# Patient Record
Sex: MALE | Race: WHITE | Employment: OTHER | URBAN - METROPOLITAN AREA
[De-identification: names, ages, dates, MRNs, and addresses within clinical notes are randomized per-mention and may not be internally consistent; named-entity substitution may affect disease eponyms.]

---

## 2017-12-28 PROBLEM — C80.1 CANCER (HCC): Status: ACTIVE | Noted: 2017-12-28

## 2018-01-23 PROBLEM — R50.9 FEVER: Status: ACTIVE | Noted: 2018-01-23

## 2018-01-24 PROBLEM — C80.1 CANCER (HCC): Status: RESOLVED | Noted: 2017-12-28 | Resolved: 2018-01-24

## 2018-01-24 PROBLEM — E86.0 DEHYDRATION: Status: ACTIVE | Noted: 2018-01-24

## 2018-01-24 PROBLEM — D64.9 ANEMIA: Status: ACTIVE | Noted: 2018-01-24

## 2018-01-24 PROBLEM — R41.0 CONFUSION: Status: ACTIVE | Noted: 2018-01-24

## 2018-01-24 PROBLEM — E11.9 TYPE 2 DIABETES MELLITUS (HCC): Chronic | Status: ACTIVE | Noted: 2018-01-24

## 2018-01-24 PROBLEM — C15.9 ESOPHAGEAL CANCER (HCC): Status: ACTIVE | Noted: 2018-01-24

## 2018-01-25 PROBLEM — R55 SYNCOPE: Status: ACTIVE | Noted: 2018-01-25

## 2018-04-11 PROBLEM — E86.0 DEHYDRATION: Status: RESOLVED | Noted: 2018-01-24 | Resolved: 2018-04-11

## 2018-04-13 ENCOUNTER — HOSPITAL ENCOUNTER (OUTPATIENT)
Dept: RADIATION ONCOLOGY | Age: 68
Discharge: HOME | End: 2018-04-13
Payer: MEDICARE

## 2018-04-13 VITALS
SYSTOLIC BLOOD PRESSURE: 136 MMHG | WEIGHT: 180 LBS | DIASTOLIC BLOOD PRESSURE: 82 MMHG | HEART RATE: 58 BPM | BODY MASS INDEX: 25.83 KG/M2

## 2018-04-13 DIAGNOSIS — C80.1 CANCER (HCC): Primary | ICD-10-CM

## 2018-04-13 DIAGNOSIS — Z92.3 S/P RADIATION THERAPY > 12 WKS AGO: ICD-10-CM

## 2018-04-13 PROCEDURE — 99213 OFFICE O/P EST LOW 20 MIN: CPT | Performed by: NURSE PRACTITIONER

## 2018-04-13 PROCEDURE — 99212 OFFICE O/P EST SF 10 MIN: CPT

## 2018-04-13 RX ORDER — AMIODARONE HYDROCHLORIDE 400 MG/1
400 TABLET ORAL DAILY
COMMUNITY
Start: 2018-02-20 | End: 2018-04-13 | Stop reason: ALTCHOICE

## 2018-04-13 RX ORDER — GLIPIZIDE 5 MG/1
2.5 TABLET ORAL DAILY
Refills: 2 | COMMUNITY
Start: 2018-04-04

## 2018-08-14 ENCOUNTER — HOSPITAL ENCOUNTER (OUTPATIENT)
Dept: RADIATION ONCOLOGY | Age: 68
Discharge: HOME | End: 2018-08-14
Payer: MEDICARE

## 2018-08-14 VITALS
DIASTOLIC BLOOD PRESSURE: 72 MMHG | SYSTOLIC BLOOD PRESSURE: 132 MMHG | HEART RATE: 59 BPM | WEIGHT: 174 LBS | TEMPERATURE: 98.1 F | BODY MASS INDEX: 24.97 KG/M2

## 2018-08-14 DIAGNOSIS — C80.1 CANCER (HCC): Primary | ICD-10-CM

## 2018-08-14 PROCEDURE — 99213 OFFICE O/P EST LOW 20 MIN: CPT | Performed by: NURSE PRACTITIONER

## 2018-08-14 PROCEDURE — 99212 OFFICE O/P EST SF 10 MIN: CPT

## 2018-08-14 RX ORDER — CAPECITABINE 500 MG/1
TABLET, FILM COATED ORAL
COMMUNITY
Start: 2018-08-09 | End: 2021-10-07 | Stop reason: CLARIF

## 2018-08-14 NOTE — PROGRESS NOTES
 Cerebral artery occlusion with cerebral infarction ([facility] [address]     Diabetes mellitus ([facility] [address]     GERD (gastroesophageal reflux disease)     Hyperlipidemia     Hypertension     TIA (transient ischemic attack)     Vocal cord paralysis, unilateral complete 02/16/2018       Past Surgical History:   Procedure Laterality Date    DIAGNOSTIC CARDIAC CATH LAB PROCEDURE      ESOPHAGECTOMY N/A 02/12/2018    [facility], Dr. Gil Stacy       Social History     Social History    Marital status:      Spouse name: N/A    Number of children: N/A    Years of education: N/A     Occupational History          Social History Main Topics    Smoking status: Never Smoker    Smokeless tobacco: Never Used    Alcohol use No    Drug use: No    Sexual activity: Not on file     Other Topics Concern    Not on file     Social History Narrative    No narrative on file     History reviewed. No pertinent family history. Allergies:   Lipitor [atorvastatin]      Current Outpatient Prescriptions   Medication Sig Dispense Refill    capecitabine (XELODA) 500 MG chemo tablet       glipiZIDE (GLUCOTROL) 5 MG tablet TAKE 1/2 TABLET BY MOUTH EVERY DAY IN THE MORNING  2    Lansoprazole (PREVACID PO) Take 30 mg by mouth daily OTC      aspirin EC 81 MG EC tablet Take 81 mg by mouth daily      metoprolol (TOPROL-XL) 50 MG XL tablet Take 50 mg by mouth daily       rosuvastatin (CRESTOR) 10 MG tablet Take 40 mg by mouth daily. No current facility-administered medications for this encounter. B-12 injection every month (receives on chemotherapy days). REVIEW OF SYSTEMS:    Constitutional:  No fever, chills or rigors. Eyes: No changes in vision. No eye discharge or pain  ENT: + Xerostomia. No headaches, hearing loss or vertigo. No mouth sores or sore throat. No change in taste or smell.    Cardiovascular: No chest discomfort, dyspnea on exertion, palpitations or loss of consciousness. Respiratory: No cough, no sputum production, wheezing and no pleuritic pain. Gastrointestinal: No abdominal pain, nausea, vomiting, diarrhea or constipation. No blood in stools or  hematemesis   Genitourinary: No dysuria, trouble voiding or hematuria. Musculoskeletal: No gait disturbance, weakness or joint complaints. Integumentary: No rash or pruritis. Neurological: + numbness/ tingling both hands. No headache, diplopia, change in muscle strength. No change in gait, balance, coordination, mood, affect, memory, mentation, behavior. Psychiatric: No anxiety or depression. Recent grief (loss of family dog). Endocrine: No temperature intolerance. No excessive thirst, fluid intake, or urination. No tremor. Hematologic/Lymphatic: No abnormal bruising or bleeding, blood clots or swollen lymph nodes. Allergic/Immunologic: No nasal congestion or hives. PHYSICAL EXAMINATION:   Vitals:    08/14/18 0906   BP: 132/72   Pulse: 59   Temp: 98.1 °F (36.7 °C)   TempSrc: Oral   Weight: 174 lb (78.9 kg)     Body mass index is 24.97 kg/m². Constitutional: A well developed, well nourished 70 y.o. male who is alert, oriented, cooperative and in no apparent distress. Head: Normocephalic and atraumatic. EENT: EOMI REJI. MMM. No icterus. External canals are patent and no discharge was appreciated. Septum was midline, mucosa was without erythema, exudates or cobblestoning. No thrush was noted. Neck: Supple without thyromegaly. Trachea was midline. No carotid bruits. No stridor or subglottic wheeze. Respiratory: Chest expansion was symmetrical. Breath sounds bilaterally were clear to auscultation. No wheezes, rhonchi or rales. No intercostal retraction or use of accessory muscles. Cardiovascular: Regular rate and rhythm. No murmur or rubs. Abdomen: Soft, rounded and without organomegaly. No rebound guarding. Lymphatic: No lymphadenopathy.   Musculoskeletal:

## 2018-08-14 NOTE — PROGRESS NOTES
Yany Apodaca  8/14/2018  9:19 AM      Vitals:    08/14/18 0906   BP: 132/72   Pulse: 59   Temp: 98.1 °F (36.7 °C)    : Wt Readings from Last 1 Encounters:   08/14/18 174 lb (78.9 kg)                Current Outpatient Prescriptions:     capecitabine (XELODA) 500 MG chemo tablet, , Disp: , Rfl:     glipiZIDE (GLUCOTROL) 5 MG tablet, TAKE 1/2 TABLET BY MOUTH EVERY DAY IN THE MORNING, Disp: , Rfl: 2    Lansoprazole (PREVACID PO), Take 30 mg by mouth daily OTC, Disp: , Rfl:     aspirin EC 81 MG EC tablet, Take 81 mg by mouth daily, Disp: , Rfl:     metoprolol (TOPROL-XL) 50 MG XL tablet, Take 50 mg by mouth daily , Disp: , Rfl:     rosuvastatin (CRESTOR) 10 MG tablet, Take 40 mg by mouth daily. , Disp: , Rfl:       Patient is seen today in follow up- patient presents as a 4 month follow up post XRT to the esophagus. The patient followed with Geovany Mason at [facility] and had a surgical resection to the tumor post concurrent chemo/RT. The patient follows with Glenn Kay for med onc management and he is still currently getting chemotherapy. The patient stated his last treatment with chemo would be in September. The patient no longer has a PEG tube and he is drinking and eating with no current issues. The patients liquids no longer have to be thickened. The patient declined wanting a falls prevention safety sheet, the patient stated he already has a falls safety sheet at home. FALLS RISK SCREEN  Instructions:  Assess the patient and enter the appropriate indicators that are present for fall risk identification. Total the numbers entered and assign a fall risk score from Table 2.  Reassess patient at a minimum every 12 weeks or with status change. Assessment   Date  8/14/2018   1. Mental Ability: confusion/cognitively impaired 0 (3)   2. Elimination Issues: incontinence, frequency 0 (3)   3.   Ambulatory: use of assistive devices (walker, cane, off-loading devices),        attached to equipment (IV pole, (Yellow sticker Level III) placed on patient chart       NUTRITION RISK SCREEN    8/14/2018   Patient:  Anat Webster  Sex:  male    NUTRITION RISK SCREEN  Instructions:  Assess the patient and enter the appropriate indicators that are present for nutrition risk identification. Total the numbers entered and assign a risk score. Follow the appropriate action for total score listed below. Assessment   Date  8/14/2018     1. Poor appetite?--a. One week or greater (1)                       b. One month or greater (2) 0        2. Unplanned wt loss?--a. Greater than 5# in 1 week(1)                                  b. Greater than 10# in 1 month (2)                                  c. Greater than 20# in 6 mos (1) 1        3. Diagnosis of Head or Neck Cancer? (2)   0    4. If yes, difficulty swallowing? (1) 0        5. Receiving nutrition via feeding tube or parenteral nutrition? (1) 0        6. If yes, report of problems with feeding tube? (1) 0      TOTAL 1         Score of 0-1: No action  Score 2 or greater:  1. For Non-Diabetic Patient: Recommend adding Ensure Complete 2xdaily and provide              patient with Ensure wellness bag with coupons; For Diabetic Patient, Recommend adding Glucerna Shake 2xdaily and provide patient with Glucerna Wellness bag with coupons  2.  Route to the dietitian via [employer]

## 2018-11-06 ENCOUNTER — OFFICE VISIT (OUTPATIENT)
Dept: CARDIOLOGY CLINIC | Age: 68
End: 2018-11-06
Payer: MEDICARE

## 2018-11-06 VITALS
HEIGHT: 71 IN | BODY MASS INDEX: 25.06 KG/M2 | RESPIRATION RATE: 16 BRPM | HEART RATE: 57 BPM | WEIGHT: 179 LBS | SYSTOLIC BLOOD PRESSURE: 146 MMHG | DIASTOLIC BLOOD PRESSURE: 88 MMHG

## 2018-11-06 DIAGNOSIS — I25.10 ATHEROSCLEROSIS OF NATIVE CORONARY ARTERY OF NATIVE HEART WITHOUT ANGINA PECTORIS: Primary | Chronic | ICD-10-CM

## 2018-11-06 PROCEDURE — 4040F PNEUMOC VAC/ADMIN/RCVD: CPT | Performed by: INTERNAL MEDICINE

## 2018-11-06 PROCEDURE — G8420 CALC BMI NORM PARAMETERS: HCPCS | Performed by: INTERNAL MEDICINE

## 2018-11-06 PROCEDURE — 93000 ELECTROCARDIOGRAM COMPLETE: CPT | Performed by: INTERNAL MEDICINE

## 2018-11-06 PROCEDURE — 3017F COLORECTAL CA SCREEN DOC REV: CPT | Performed by: INTERNAL MEDICINE

## 2018-11-06 PROCEDURE — 99213 OFFICE O/P EST LOW 20 MIN: CPT | Performed by: INTERNAL MEDICINE

## 2018-11-06 PROCEDURE — 1101F PT FALLS ASSESS-DOCD LE1/YR: CPT | Performed by: INTERNAL MEDICINE

## 2018-11-06 PROCEDURE — 1123F ACP DISCUSS/DSCN MKR DOCD: CPT | Performed by: INTERNAL MEDICINE

## 2018-11-06 PROCEDURE — 1036F TOBACCO NON-USER: CPT | Performed by: INTERNAL MEDICINE

## 2018-11-06 PROCEDURE — G8598 ASA/ANTIPLAT THER USED: HCPCS | Performed by: INTERNAL MEDICINE

## 2018-11-06 PROCEDURE — G8427 DOCREV CUR MEDS BY ELIG CLIN: HCPCS | Performed by: INTERNAL MEDICINE

## 2018-11-06 PROCEDURE — G8484 FLU IMMUNIZE NO ADMIN: HCPCS | Performed by: INTERNAL MEDICINE

## 2018-11-06 NOTE — PROGRESS NOTES
History reviewed. No pertinent family history. SUBJECTIVE: Candice Sanford presents to the office today for follow up of chronic cardiac diagnoses . He complains of no cardiac symptoms and denies chest pain, dyspnea, fatigue, irregular heart beat, near-syncope, orthopnea, palpitations, paroxysmal nocturnal dyspnea and syncope. He is back to usual routine of walking and doing chores. Extensive treatment of local oncology and [address] [place] [address] surgeon for esophageal CA with surgery, XRT and chemo. Also recent BCDs no progression         Review of Systems:  Negative 10 system review other than stated above. Objective:   Physical Exam   Constitutional: He is oriented to person, place, and time. He appears well-developed. He is cooperative. See vitals above. HENT:   Head: Normocephalic and atraumatic. Normal lips, teeth, gums, oral mucosa wet. Eyes: Conjunctivae are normal. Pupils are equal, round  Neck: No JVD present. Cardiovascular: Regular rhythm, S1 normal, S2 normal, intact distal pulses and normal pulses. PMI is not displaced. Exam reveals no gallop. No murmur heard. Carotid bruit is not present   Pulmonary/Chest: Effort normal and breath sounds normal. No respiratory distress. Abdominal: Soft. Normal appearance and bowel sounds are normal. He exhibits no abdominal bruit and no pulsatile midline mass. There is no hepatosplenomegaly. There is no tenderness. Musculoskeletal: Normal range of motion. He exhibits no edema. Gait normal   Neurological: He is alert and oriented to person, place, and time. Gait normal.   Skin: Skin is warm and dry. No bruising, no ecchymosis and no rash noted. Rash is not nodular. He is not diaphoretic. No cyanosis. Psychiatric: He has a normal mood and affect. His behavior is normal. Thought content normal.     EKG: nonspecific ST and T waves changes, sinus bradycardia  , unchanged from previous tracings.      ASSESSMENT & PLAN:    Patient Active Problem List

## 2018-11-07 ENCOUNTER — PRE-PROCEDURE TELEPHONE (OUTPATIENT)
Dept: RADIATION ONCOLOGY | Age: 68
End: 2018-11-07

## 2019-11-19 ENCOUNTER — OFFICE VISIT (OUTPATIENT)
Dept: CARDIOLOGY CLINIC | Age: 69
End: 2019-11-19
Payer: MEDICARE

## 2019-11-19 VITALS
WEIGHT: 192 LBS | BODY MASS INDEX: 26.88 KG/M2 | HEART RATE: 43 BPM | DIASTOLIC BLOOD PRESSURE: 82 MMHG | SYSTOLIC BLOOD PRESSURE: 142 MMHG | HEIGHT: 71 IN | RESPIRATION RATE: 16 BRPM

## 2019-11-19 DIAGNOSIS — I25.10 ATHEROSCLEROSIS OF NATIVE CORONARY ARTERY OF NATIVE HEART WITHOUT ANGINA PECTORIS: Primary | ICD-10-CM

## 2019-11-19 DIAGNOSIS — E78.5 HYPERLIPIDEMIA, UNSPECIFIED HYPERLIPIDEMIA TYPE: ICD-10-CM

## 2019-11-19 PROCEDURE — 3017F COLORECTAL CA SCREEN DOC REV: CPT | Performed by: INTERNAL MEDICINE

## 2019-11-19 PROCEDURE — 93000 ELECTROCARDIOGRAM COMPLETE: CPT | Performed by: INTERNAL MEDICINE

## 2019-11-19 PROCEDURE — G8484 FLU IMMUNIZE NO ADMIN: HCPCS | Performed by: INTERNAL MEDICINE

## 2019-11-19 PROCEDURE — G8598 ASA/ANTIPLAT THER USED: HCPCS | Performed by: INTERNAL MEDICINE

## 2019-11-19 PROCEDURE — G8427 DOCREV CUR MEDS BY ELIG CLIN: HCPCS | Performed by: INTERNAL MEDICINE

## 2019-11-19 PROCEDURE — 4040F PNEUMOC VAC/ADMIN/RCVD: CPT | Performed by: INTERNAL MEDICINE

## 2019-11-19 PROCEDURE — G8417 CALC BMI ABV UP PARAM F/U: HCPCS | Performed by: INTERNAL MEDICINE

## 2019-11-19 PROCEDURE — 1123F ACP DISCUSS/DSCN MKR DOCD: CPT | Performed by: INTERNAL MEDICINE

## 2019-11-19 PROCEDURE — 99214 OFFICE O/P EST MOD 30 MIN: CPT | Performed by: INTERNAL MEDICINE

## 2019-11-19 PROCEDURE — 1036F TOBACCO NON-USER: CPT | Performed by: INTERNAL MEDICINE

## 2019-11-19 RX ORDER — LISINOPRIL 2.5 MG/1
TABLET ORAL
Refills: 5 | Status: ON HOLD | COMMUNITY
Start: 2019-10-22 | End: 2020-09-07 | Stop reason: HOSPADM

## 2020-09-04 ENCOUNTER — HOSPITAL ENCOUNTER (INPATIENT)
Age: 70
LOS: 3 days | Discharge: HOME | DRG: 684 | End: 2020-09-07
Attending: EMERGENCY MEDICINE | Admitting: FAMILY MEDICINE
Payer: MEDICARE

## 2020-09-04 ENCOUNTER — APPOINTMENT (OUTPATIENT)
Dept: ULTRASOUND IMAGING | Age: 70
DRG: 684 | End: 2020-09-04
Payer: MEDICARE

## 2020-09-04 PROBLEM — N18.9 ACUTE KIDNEY INJURY SUPERIMPOSED ON CHRONIC KIDNEY DISEASE (HCC): Status: ACTIVE | Noted: 2020-09-04

## 2020-09-04 PROBLEM — I16.0 HYPERTENSIVE URGENCY: Status: ACTIVE | Noted: 2020-09-04

## 2020-09-04 PROBLEM — N17.9 ACUTE KIDNEY INJURY SUPERIMPOSED ON CHRONIC KIDNEY DISEASE (HCC): Status: ACTIVE | Noted: 2020-09-04

## 2020-09-04 PROBLEM — N18.9 CHRONIC RENAL INSUFFICIENCY: Chronic | Status: ACTIVE | Noted: 2020-09-04

## 2020-09-04 LAB
ALBUMIN SERPL-MCNC: 4.3 G/DL (ref 3.5–5.2)
ALP BLD-CCNC: 127 U/L (ref 40–129)
ALT SERPL-CCNC: 31 U/L (ref 0–40)
AMORPHOUS: ABNORMAL
ANION GAP SERPL CALCULATED.3IONS-SCNC: 10 MMOL/L (ref 7–16)
AST SERPL-CCNC: 31 U/L (ref 0–39)
BACTERIA: ABNORMAL /HPF
BASOPHILS ABSOLUTE: 0.05 E9/L (ref 0–0.2)
BASOPHILS RELATIVE PERCENT: 0.7 % (ref 0–2)
BILIRUB SERPL-MCNC: 0.9 MG/DL (ref 0–1.2)
BILIRUBIN URINE: NEGATIVE
BLOOD, URINE: ABNORMAL
BUN BLDV-MCNC: 39 MG/DL (ref 8–23)
C3 COMPLEMENT: 111 MG/DL (ref 90–180)
C4 COMPLEMENT: 30 MG/DL (ref 10–40)
CALCIUM SERPL-MCNC: 9.2 MG/DL (ref 8.6–10.2)
CHLORIDE BLD-SCNC: 108 MMOL/L (ref 98–107)
CHLORIDE URINE RANDOM: 45 MMOL/L
CLARITY: CLEAR
CO2: 24 MMOL/L (ref 22–29)
COARSE CASTS, UA: ABNORMAL /LPF (ref 0–2)
COLOR: YELLOW
CREAT SERPL-MCNC: 3 MG/DL (ref 0.7–1.2)
CREATININE URINE: 105 MG/DL (ref 40–278)
EOSINOPHILS ABSOLUTE: 0.31 E9/L (ref 0.05–0.5)
EOSINOPHILS RELATIVE PERCENT: 4.3 % (ref 0–6)
GFR AFRICAN AMERICAN: 25
GFR NON-AFRICAN AMERICAN: 21 ML/MIN/1.73
GLUCOSE BLD-MCNC: 104 MG/DL (ref 74–99)
GLUCOSE URINE: NEGATIVE MG/DL
HAPTOGLOBIN: 122 MG/DL (ref 30–200)
HCT VFR BLD CALC: 40.1 % (ref 37–54)
HEMOGLOBIN: 14 G/DL (ref 12.5–16.5)
IMMATURE GRANULOCYTES #: 0.02 E9/L
IMMATURE GRANULOCYTES %: 0.3 % (ref 0–5)
KETONES, URINE: NEGATIVE MG/DL
LACTATE DEHYDROGENASE: 283 U/L (ref 135–225)
LEUKOCYTE ESTERASE, URINE: NEGATIVE
LYMPHOCYTES ABSOLUTE: 2.06 E9/L (ref 1.5–4)
LYMPHOCYTES RELATIVE PERCENT: 28.7 % (ref 20–42)
MCH RBC QN AUTO: 33.9 PG (ref 26–35)
MCHC RBC AUTO-ENTMCNC: 34.9 % (ref 32–34.5)
MCV RBC AUTO: 97.1 FL (ref 80–99.9)
METER GLUCOSE: 146 MG/DL (ref 74–99)
MONOCYTES ABSOLUTE: 0.61 E9/L (ref 0.1–0.95)
MONOCYTES RELATIVE PERCENT: 8.5 % (ref 2–12)
NEUTROPHILS ABSOLUTE: 4.14 E9/L (ref 1.8–7.3)
NEUTROPHILS RELATIVE PERCENT: 57.5 % (ref 43–80)
NITRITE, URINE: NEGATIVE
PDW BLD-RTO: 13.2 FL (ref 11.5–15)
PH UA: 5.5 (ref 5–9)
PLATELET # BLD: 129 E9/L (ref 130–450)
PMV BLD AUTO: 9.5 FL (ref 7–12)
POTASSIUM REFLEX MAGNESIUM: 3.9 MMOL/L (ref 3.5–5)
POTASSIUM, UR: 24.5 MMOL/L
PROTEIN PROTEIN: 45 MG/DL (ref 0–12)
PROTEIN UA: ABNORMAL MG/DL
RBC # BLD: 4.13 E12/L (ref 3.8–5.8)
RBC UA: ABNORMAL /HPF (ref 0–2)
REASON FOR REJECTION: NORMAL
REJECTED TEST: NORMAL
SODIUM BLD-SCNC: 142 MMOL/L (ref 132–146)
SODIUM URINE: 54 MMOL/L
SPECIFIC GRAVITY UA: 1.02 (ref 1–1.03)
TOTAL PROTEIN: 7.2 G/DL (ref 6.4–8.3)
UROBILINOGEN, URINE: 0.2 E.U./DL
WBC # BLD: 7.2 E9/L (ref 4.5–11.5)
WBC UA: ABNORMAL /HPF (ref 0–5)

## 2020-09-04 PROCEDURE — 2060000000 HC ICU INTERMEDIATE R&B

## 2020-09-04 PROCEDURE — 82962 GLUCOSE BLOOD TEST: CPT

## 2020-09-04 PROCEDURE — 86803 HEPATITIS C AB TEST: CPT

## 2020-09-04 PROCEDURE — 83010 ASSAY OF HAPTOGLOBIN QUANT: CPT

## 2020-09-04 PROCEDURE — 2580000003 HC RX 258: Performed by: STUDENT IN AN ORGANIZED HEALTH CARE EDUCATION/TRAINING PROGRAM

## 2020-09-04 PROCEDURE — 96374 THER/PROPH/DIAG INJ IV PUSH: CPT

## 2020-09-04 PROCEDURE — 80053 COMPREHEN METABOLIC PANEL: CPT

## 2020-09-04 PROCEDURE — 99284 EMERGENCY DEPT VISIT MOD MDM: CPT

## 2020-09-04 PROCEDURE — 85025 COMPLETE CBC W/AUTO DIFF WBC: CPT

## 2020-09-04 PROCEDURE — 86160 COMPLEMENT ANTIGEN: CPT

## 2020-09-04 PROCEDURE — 6370000000 HC RX 637 (ALT 250 FOR IP): Performed by: INTERNAL MEDICINE

## 2020-09-04 PROCEDURE — 82436 ASSAY OF URINE CHLORIDE: CPT

## 2020-09-04 PROCEDURE — 87340 HEPATITIS B SURFACE AG IA: CPT

## 2020-09-04 PROCEDURE — 81001 URINALYSIS AUTO W/SCOPE: CPT

## 2020-09-04 PROCEDURE — 86706 HEP B SURFACE ANTIBODY: CPT

## 2020-09-04 PROCEDURE — 86038 ANTINUCLEAR ANTIBODIES: CPT

## 2020-09-04 PROCEDURE — 6360000002 HC RX W HCPCS: Performed by: INTERNAL MEDICINE

## 2020-09-04 PROCEDURE — 84156 ASSAY OF PROTEIN URINE: CPT

## 2020-09-04 PROCEDURE — 86255 FLUORESCENT ANTIBODY SCREEN: CPT

## 2020-09-04 PROCEDURE — 6370000000 HC RX 637 (ALT 250 FOR IP): Performed by: FAMILY MEDICINE

## 2020-09-04 PROCEDURE — 84300 ASSAY OF URINE SODIUM: CPT

## 2020-09-04 PROCEDURE — 36415 COLL VENOUS BLD VENIPUNCTURE: CPT

## 2020-09-04 PROCEDURE — 83615 LACTATE (LD) (LDH) ENZYME: CPT

## 2020-09-04 PROCEDURE — 82570 ASSAY OF URINE CREATININE: CPT

## 2020-09-04 PROCEDURE — 76770 US EXAM ABDO BACK WALL COMP: CPT

## 2020-09-04 PROCEDURE — 83516 IMMUNOASSAY NONANTIBODY: CPT

## 2020-09-04 PROCEDURE — 2500000003 HC RX 250 WO HCPCS: Performed by: INTERNAL MEDICINE

## 2020-09-04 PROCEDURE — 6360000002 HC RX W HCPCS: Performed by: STUDENT IN AN ORGANIZED HEALTH CARE EDUCATION/TRAINING PROGRAM

## 2020-09-04 PROCEDURE — 84133 ASSAY OF URINE POTASSIUM: CPT

## 2020-09-04 RX ORDER — SODIUM CHLORIDE 0.9 % (FLUSH) 0.9 %
10 SYRINGE (ML) INJECTION EVERY 12 HOURS SCHEDULED
Status: DISCONTINUED | OUTPATIENT
Start: 2020-09-04 | End: 2020-09-07 | Stop reason: HOSPADM

## 2020-09-04 RX ORDER — M-VIT,TX,IRON,MINS/CALC/FOLIC 27MG-0.4MG
1 TABLET ORAL DAILY
Status: DISCONTINUED | OUTPATIENT
Start: 2020-09-04 | End: 2020-09-07 | Stop reason: HOSPADM

## 2020-09-04 RX ORDER — ACETAMINOPHEN 325 MG/1
650 TABLET ORAL EVERY 4 HOURS PRN
Status: DISCONTINUED | OUTPATIENT
Start: 2020-09-04 | End: 2020-09-07 | Stop reason: HOSPADM

## 2020-09-04 RX ORDER — DEXTROSE MONOHYDRATE 50 MG/ML
100 INJECTION, SOLUTION INTRAVENOUS PRN
Status: DISCONTINUED | OUTPATIENT
Start: 2020-09-04 | End: 2020-09-07 | Stop reason: HOSPADM

## 2020-09-04 RX ORDER — METOPROLOL SUCCINATE 25 MG/1
25 TABLET, EXTENDED RELEASE ORAL NIGHTLY
Status: DISCONTINUED | OUTPATIENT
Start: 2020-09-04 | End: 2020-09-07 | Stop reason: HOSPADM

## 2020-09-04 RX ORDER — LABETALOL HYDROCHLORIDE 5 MG/ML
10 INJECTION, SOLUTION INTRAVENOUS EVERY 4 HOURS PRN
Status: DISCONTINUED | OUTPATIENT
Start: 2020-09-04 | End: 2020-09-07 | Stop reason: HOSPADM

## 2020-09-04 RX ORDER — VITAMIN B COMPLEX
1000 TABLET ORAL DAILY
Status: DISCONTINUED | OUTPATIENT
Start: 2020-09-04 | End: 2020-09-07 | Stop reason: HOSPADM

## 2020-09-04 RX ORDER — ROSUVASTATIN CALCIUM 20 MG/1
40 TABLET, COATED ORAL DAILY
Status: DISCONTINUED | OUTPATIENT
Start: 2020-09-04 | End: 2020-09-07 | Stop reason: HOSPADM

## 2020-09-04 RX ORDER — LANOLIN ALCOHOL/MO/W.PET/CERES
1000 CREAM (GRAM) TOPICAL DAILY
Status: DISCONTINUED | OUTPATIENT
Start: 2020-09-04 | End: 2020-09-07 | Stop reason: HOSPADM

## 2020-09-04 RX ORDER — METOPROLOL SUCCINATE 25 MG/1
25 TABLET, EXTENDED RELEASE ORAL DAILY
Status: DISCONTINUED | OUTPATIENT
Start: 2020-09-04 | End: 2020-09-04 | Stop reason: SDUPTHER

## 2020-09-04 RX ORDER — CLONIDINE HYDROCHLORIDE 0.1 MG/1
0.1 TABLET ORAL ONCE
Status: COMPLETED | OUTPATIENT
Start: 2020-09-04 | End: 2020-09-04

## 2020-09-04 RX ORDER — SODIUM CHLORIDE 9 MG/ML
INJECTION, SOLUTION INTRAVENOUS CONTINUOUS
Status: DISCONTINUED | OUTPATIENT
Start: 2020-09-04 | End: 2020-09-06

## 2020-09-04 RX ORDER — 0.9 % SODIUM CHLORIDE 0.9 %
1000 INTRAVENOUS SOLUTION INTRAVENOUS ONCE
Status: COMPLETED | OUTPATIENT
Start: 2020-09-04 | End: 2020-09-04

## 2020-09-04 RX ORDER — CLONIDINE HYDROCHLORIDE 0.1 MG/1
0.1 TABLET ORAL 2 TIMES DAILY
Status: DISCONTINUED | OUTPATIENT
Start: 2020-09-04 | End: 2020-09-04

## 2020-09-04 RX ORDER — NICOTINE POLACRILEX 4 MG
15 LOZENGE BUCCAL PRN
Status: DISCONTINUED | OUTPATIENT
Start: 2020-09-04 | End: 2020-09-07 | Stop reason: HOSPADM

## 2020-09-04 RX ORDER — LANSOPRAZOLE 30 MG/1
30 CAPSULE, DELAYED RELEASE ORAL DAILY
Status: DISCONTINUED | OUTPATIENT
Start: 2020-09-04 | End: 2020-09-04 | Stop reason: ALTCHOICE

## 2020-09-04 RX ORDER — M-VIT,TX,IRON,MINS/CALC/FOLIC 27MG-0.4MG
1 TABLET ORAL DAILY
COMMUNITY

## 2020-09-04 RX ORDER — HYDRALAZINE HYDROCHLORIDE 20 MG/ML
5 INJECTION INTRAMUSCULAR; INTRAVENOUS EVERY 4 HOURS PRN
Status: DISCONTINUED | OUTPATIENT
Start: 2020-09-04 | End: 2020-09-07 | Stop reason: HOSPADM

## 2020-09-04 RX ORDER — HEPARIN SODIUM 10000 [USP'U]/ML
5000 INJECTION, SOLUTION INTRAVENOUS; SUBCUTANEOUS EVERY 8 HOURS SCHEDULED
Status: DISCONTINUED | OUTPATIENT
Start: 2020-09-04 | End: 2020-09-07 | Stop reason: HOSPADM

## 2020-09-04 RX ORDER — POLYETHYLENE GLYCOL 3350 17 G/17G
17 POWDER, FOR SOLUTION ORAL DAILY PRN
Status: DISCONTINUED | OUTPATIENT
Start: 2020-09-04 | End: 2020-09-07 | Stop reason: HOSPADM

## 2020-09-04 RX ORDER — ASPIRIN 81 MG/1
81 TABLET ORAL DAILY
Status: DISCONTINUED | OUTPATIENT
Start: 2020-09-04 | End: 2020-09-07 | Stop reason: HOSPADM

## 2020-09-04 RX ORDER — PANTOPRAZOLE SODIUM 40 MG/1
40 TABLET, DELAYED RELEASE ORAL
Status: DISCONTINUED | OUTPATIENT
Start: 2020-09-05 | End: 2020-09-07 | Stop reason: HOSPADM

## 2020-09-04 RX ORDER — DEXTROSE MONOHYDRATE 25 G/50ML
12.5 INJECTION, SOLUTION INTRAVENOUS PRN
Status: DISCONTINUED | OUTPATIENT
Start: 2020-09-04 | End: 2020-09-07 | Stop reason: HOSPADM

## 2020-09-04 RX ORDER — GLIPIZIDE 5 MG/1
2.5 TABLET ORAL
Status: DISCONTINUED | OUTPATIENT
Start: 2020-09-05 | End: 2020-09-07 | Stop reason: HOSPADM

## 2020-09-04 RX ORDER — HYDRALAZINE HYDROCHLORIDE 25 MG/1
25 TABLET, FILM COATED ORAL EVERY 8 HOURS SCHEDULED
Status: DISCONTINUED | OUTPATIENT
Start: 2020-09-04 | End: 2020-09-05

## 2020-09-04 RX ORDER — LANOLIN ALCOHOL/MO/W.PET/CERES
1000 CREAM (GRAM) TOPICAL DAILY
COMMUNITY

## 2020-09-04 RX ORDER — SODIUM CHLORIDE 0.9 % (FLUSH) 0.9 %
10 SYRINGE (ML) INJECTION PRN
Status: DISCONTINUED | OUTPATIENT
Start: 2020-09-04 | End: 2020-09-07 | Stop reason: HOSPADM

## 2020-09-04 RX ORDER — HYDRALAZINE HYDROCHLORIDE 20 MG/ML
10 INJECTION INTRAMUSCULAR; INTRAVENOUS ONCE
Status: COMPLETED | OUTPATIENT
Start: 2020-09-04 | End: 2020-09-04

## 2020-09-04 RX ORDER — SODIUM BICARBONATE 650 MG/1
650 TABLET ORAL 2 TIMES DAILY
COMMUNITY

## 2020-09-04 RX ORDER — SODIUM BICARBONATE 650 MG/1
650 TABLET ORAL 2 TIMES DAILY
Status: DISCONTINUED | OUTPATIENT
Start: 2020-09-04 | End: 2020-09-07 | Stop reason: HOSPADM

## 2020-09-04 RX ADMIN — CYANOCOBALAMIN TAB 1000 MCG 1000 MCG: 1000 TAB at 17:40

## 2020-09-04 RX ADMIN — HYDRALAZINE HYDROCHLORIDE 10 MG: 20 INJECTION, SOLUTION INTRAMUSCULAR; INTRAVENOUS at 13:49

## 2020-09-04 RX ADMIN — HEPARIN SODIUM 5000 UNITS: 10000 INJECTION INTRAVENOUS; SUBCUTANEOUS at 17:33

## 2020-09-04 RX ADMIN — MULTIPLE VITAMINS W/ MINERALS TAB 1 TABLET: TAB at 17:33

## 2020-09-04 RX ADMIN — HEPARIN SODIUM 5000 UNITS: 10000 INJECTION INTRAVENOUS; SUBCUTANEOUS at 21:31

## 2020-09-04 RX ADMIN — SODIUM CHLORIDE: 9 INJECTION, SOLUTION INTRAVENOUS at 14:56

## 2020-09-04 RX ADMIN — SODIUM BICARBONATE 650 MG: 650 TABLET ORAL at 20:24

## 2020-09-04 RX ADMIN — SODIUM CHLORIDE 1000 ML: 9 INJECTION, SOLUTION INTRAVENOUS at 12:31

## 2020-09-04 RX ADMIN — HYDRALAZINE HYDROCHLORIDE 5 MG: 20 INJECTION INTRAMUSCULAR; INTRAVENOUS at 21:35

## 2020-09-04 RX ADMIN — CLONIDINE HYDROCHLORIDE 0.1 MG: 0.1 TABLET ORAL at 16:02

## 2020-09-04 RX ADMIN — ROSUVASTATIN CALCIUM 40 MG: 20 TABLET, FILM COATED ORAL at 17:33

## 2020-09-04 RX ADMIN — LABETALOL HYDROCHLORIDE 10 MG: 5 INJECTION INTRAVENOUS at 15:33

## 2020-09-04 RX ADMIN — METOPROLOL SUCCINATE 25 MG: 25 TABLET, EXTENDED RELEASE ORAL at 20:24

## 2020-09-04 RX ADMIN — VITAMIN D 1000 UNITS: 25 TAB ORAL at 17:33

## 2020-09-04 RX ADMIN — HYDRALAZINE HYDROCHLORIDE 25 MG: 25 TABLET, FILM COATED ORAL at 17:38

## 2020-09-04 RX ADMIN — ASPIRIN 81 MG: 81 TABLET, COATED ORAL at 17:33

## 2020-09-04 ASSESSMENT — ENCOUNTER SYMPTOMS
WHEEZING: 0
DIARRHEA: 0
ABDOMINAL PAIN: 0
EYE DISCHARGE: 0
SORE THROAT: 0
NAUSEA: 0
VOMITING: 0
SINUS PRESSURE: 0
BACK PAIN: 0
SHORTNESS OF BREATH: 0
COUGH: 0
EYE REDNESS: 0
EYE PAIN: 0

## 2020-09-04 ASSESSMENT — PAIN SCALES - GENERAL: PAINLEVEL_OUTOF10: 0

## 2020-09-04 NOTE — CONSULTS
Nephrology Consult Note  Patient's Name: Jason Camacho  2:43 PM  9/4/2020    Nephrologist: Ania Butler    Reason for Consult:  THEODORA on CKD G3  Requesting Physician:  Danny Jackson MD    Chief Complaint:  Elevated cr    History Obtained From:  patient and past medical records    History of Present Ilness:    Jason Camacho is a 69 y.o. male with prior history CKD G3 with a  cr from 1.2-->1.4mg/dl. Pt seen by me in 2012 at which time he had a serum cr 1.5mg/dl. 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 Since ending the Chemo Rx, only issue infrequent kidney stone with out obstruction. Hx HTN for 10yrs with Hx CVA, No CAD/MI, no CHF. He has an 8 yr Hx DM2 no retinopathy but Neuropathy. His most recent creatinine in April 2020 2.0mg/dl. Dr. Travis Gallardo called 8/31/20 with the cr up to 2.5mg/dl and the lisinopril was stopped. A repeat cr 2 days ago up to 2.9mg/dl and in the ED today the cr up to 3.0mg/dl. Pt states over the recent past few days including today he has passed numerous kidney stones. No associated pain fever or chills. //94 since admission. He denies blurry vision, double vision, flushing, CP or SOB      Past Medical History:   Diagnosis Date    Cancer ([address] 2017    esophageal    Cerebral artery occlusion with cerebral infarction ([address]     Diabetes mellitus ([address]     GERD (gastroesophageal reflux disease)     Hyperlipidemia     Hypertension     TIA (transient ischemic attack)     Vocal cord paralysis, unilateral complete 02/16/2018       Past Surgical History:   Procedure Laterality Date    DIAGNOSTIC CARDIAC CATH LAB PROCEDURE      ESOPHAGECTOMY N/A 02/12/2018    [facility], Dr. Cisneros Deep         History reviewed.  No BASOPCT 0.7 09/04/2020    MONOSABS 0.61 09/04/2020    LYMPHSABS 2.06 09/04/2020    EOSABS 0.31 09/04/2020    BASOSABS 0.05 09/04/2020     Hemoglobin/Hematocrit:    Lab Results   Component Value Date    HGB 14.0 09/04/2020    HCT 40.1 09/04/2020     CMP:    Lab Results   Component Value Date     09/04/2020    K 3.9 09/04/2020     09/04/2020    CO2 24 09/04/2020    BUN 39 09/04/2020    CREATININE 3.0 09/04/2020    GFRAA 25 09/04/2020    LABGLOM 21 09/04/2020    GLUCOSE 104 09/04/2020    GLUCOSE 136 06/17/2011    PROT 7.2 09/04/2020    LABALBU 4.3 09/04/2020    CALCIUM 9.2 09/04/2020    BILITOT 0.9 09/04/2020    ALKPHOS 127 09/04/2020    AST 31 09/04/2020    ALT 31 09/04/2020     BMP:    Lab Results   Component Value Date     09/04/2020    K 3.9 09/04/2020     09/04/2020    CO2 24 09/04/2020    BUN 39 09/04/2020    LABALBU 4.3 09/04/2020    CREATININE 3.0 09/04/2020    CALCIUM 9.2 09/04/2020    GFRAA 25 09/04/2020    LABGLOM 21 09/04/2020    GLUCOSE 104 09/04/2020    GLUCOSE 136 06/17/2011     BUN/Creatinine:    Lab Results   Component Value Date    BUN 39 09/04/2020    CREATININE 3.0 09/04/2020     Hepatic Function Panel:    Lab Results   Component Value Date    ALKPHOS 127 09/04/2020    ALT 31 09/04/2020    AST 31 09/04/2020    PROT 7.2 09/04/2020    BILITOT 0.9 09/04/2020    BILIDIR 0.3 01/23/2018    IBILI 1.0 01/23/2018    LABALBU 4.3 09/04/2020     Albumin:    Lab Results   Component Value Date    LABALBU 4.3 09/04/2020     Calcium:    Lab Results   Component Value Date    CALCIUM 9.2 09/04/2020     Ionized Calcium:  No results found for: IONCA  Magnesium:  No results found for: MG  Phosphorus:  No results found for: PHOS  LDH:  No results found for: LDH  Uric Acid:  No results found for: LABURIC, URICACID  PT/INR:    Lab Results   Component Value Date    PROTIME 15.4 01/23/2018    PROTIME 12.1 06/17/2011    INR 1.4 01/23/2018     PTT:    Lab Results   Component Value Date    APTT 27.2 Bilateral nonobstructive renal calculi are seen. 10 mm calculus is    seen within an inferior pole calyx of the right kidney. An 18 mm    calculus is also seen within an inferior pole calyx of the left    kidney. No hydronephrosis is seen. Small cysts are seen along the    upper and lower poles of the left kidney.         The urinary bladder is decompressed  but otherwise grossly    unremarkable.              Impression          Bilateral nephrolithiasis. No hydronephrosis. Assessment  1-Stage II THEODORA with a FeNa 1.09% leading away from decreased effective renal perfusion as the etiology-acute issue may reflect the uncontrolled HTN  Renal US no hydro  UA small blood, protein trace, (+) casts, WBC 5-10 rare angie, LE (-), Ni (-)  Urine protein:Cr 0.43 which does not suggest Nephrotic Syndrome  PLAN:1. Start IVF   2-Check basic serologies  3. Continue off ACEI    2- HTN with CKD I-IV with HTN Urgency  BP above goal <130/80  PLAN:1. Resume metoprolol   2. Use IV hydralazine and alternating IV labetalol prn once has a functioning IV access  3. Dose presently with clonidine 0.1mg po as IV site at the time of my visit not working  4. Start oral hydralazine    3. CKD G3 with a  cr from 1.2-->1.4mg/dl presumed sec to microvascular disease and a Hx of Nephrolithiasis  PLAN:1. Strain urine for stone to send for sample    4. Sec HPTH of Renal Origin  Ca++ WNL  PLAN:1. Check PO4, PTH, Vit D    5. Thrombocytopenia  With the Hx of the Cancer and uncontrolled HTN and the THEODORA will R/O hemolysis  PLAN:1.  Check LDH and a Haptoglobin  Follow PLT    Thank you Dr. Sheryll Nyhan, MD for allowing us to participate in care of Travis Rao  2:43 PM  9/4/2020

## 2020-09-04 NOTE — PROGRESS NOTES
Feels ok  vss except elevated BP  Lungs clear  Reg rhythm  Abd- soft and non-tender  Ext- no edema  Stable- elevated BP  Per orders- continue present care

## 2020-09-04 NOTE — PROGRESS NOTES
Database complete. Medications reconciled. Care plans and education initiated. Cardiologist is Dr. Lyndon Enriquez. Nephrologist is Dr. Vj Chan. Oncologist at [facility] is Dr. Pavan Matthew and locally is Dr. Trista Toure.

## 2020-09-04 NOTE — ED PROVIDER NOTES
Chief Complaint   Patient presents with    Other     abnormal kidney labs, pt asymptomatic / told to come in per blue        Patient is a 76-year-old male with history of chronic kidney disease who presents to the ED for evaluation of abnormal lab work. Patient states he had routine blood work done at his PCPs office earlier this week, and was called today telling him to come to the ER for evaluation of elevated creatinine. Patient states his creatinine has been bouncing between 1.5 to 2, however states his creatinine bumped to 2.9 today. Patient follows with nephrology Dr. Tanya Santo. Patient denies any symptoms at this time, he denies numbness, tingling, weakness extremities, denies headaches, blurry vision or change in vision. He denies chest pain, shortness of breath, nausea, vomiting or diarrhea. Still making normal urine, he states he is staying well-hydrated. The history is provided by the patient. No  was used. Review of Systems   Constitutional: Negative for chills and fever. HENT: Negative for ear pain, sinus pressure and sore throat. Eyes: Negative for pain, discharge and redness. Respiratory: Negative for cough, shortness of breath and wheezing. Cardiovascular: Negative for chest pain. Gastrointestinal: Negative for abdominal pain, diarrhea, nausea and vomiting. Genitourinary: Negative for difficulty urinating, dysuria and frequency. Musculoskeletal: Negative for arthralgias and back pain. Skin: Negative for rash and wound. Neurological: Negative for weakness and headaches. Hematological: Negative for adenopathy. Psychiatric/Behavioral: Negative for behavioral problems and confusion. All other systems reviewed and are negative. Physical Exam  Vitals signs and nursing note reviewed. Constitutional:       General: He is not in acute distress. Appearance: Normal appearance. He is well-developed. He is not ill-appearing.    HENT: history of Cancer [facility]), Cerebral artery occlusion with cerebral infarction ([facility] [address] Diabetes mellitus ([facility] [address] GERD (gastroesophageal reflux disease), Hyperlipidemia, Hypertension, TIA (transient ischemic attack), and Vocal cord paralysis, unilateral complete. Past Surgical History:  has a past surgical history that includes Tonsillectomy; knee surgery; Diagnostic Cardiac Cath Lab Procedure; and Esophagectomy (N/A, 02/12/2018). Social History:  reports that he has never smoked. He has never used smokeless tobacco. He reports that he does not drink alcohol or use drugs. Family History: family history includes Cancer in his father and mother; Other in his brother. The patients home medications have been reviewed.     Allergies: Lipitor [atorvastatin]    -------------------------------------------------- RESULTS -------------------------------------------------    LABS:  Results for orders placed or performed during the hospital encounter of 09/04/20   CBC Auto Differential   Result Value Ref Range    WBC 7.2 4.5 - 11.5 E9/L    RBC 4.13 3.80 - 5.80 E12/L    Hemoglobin 14.0 12.5 - 16.5 g/dL    Hematocrit 40.1 37.0 - 54.0 %    MCV 97.1 80.0 - 99.9 fL    MCH 33.9 26.0 - 35.0 pg    MCHC 34.9 (H) 32.0 - 34.5 %    RDW 13.2 11.5 - 15.0 fL    Platelets 800 (L) 179 - 450 E9/L    MPV 9.5 7.0 - 12.0 fL    Neutrophils % 57.5 43.0 - 80.0 %    Immature Granulocytes % 0.3 0.0 - 5.0 %    Lymphocytes % 28.7 20.0 - 42.0 %    Monocytes % 8.5 2.0 - 12.0 %    Eosinophils % 4.3 0.0 - 6.0 %    Basophils % 0.7 0.0 - 2.0 %    Neutrophils Absolute 4.14 1.80 - 7.30 E9/L    Immature Granulocytes # 0.02 E9/L    Lymphocytes Absolute 2.06 1.50 - 4.00 E9/L    Monocytes Absolute 0.61 0.10 - 0.95 E9/L    Eosinophils Absolute 0.31 0.05 - 0.50 E9/L    Basophils Absolute 0.05 0.00 - 0.20 E9/L   Comprehensive Metabolic Panel w/ Reflex to MG   Result Value Ref Range    Sodium 142 132 - 146 mmol/L    Potassium reflex Magnesium 3.9 3.5 - 5.0 mmol/L Chloride 108 (H) 98 - 107 mmol/L    CO2 24 22 - 29 mmol/L    Anion Gap 10 7 - 16 mmol/L    Glucose 104 (H) 74 - 99 mg/dL    BUN 39 (H) 8 - 23 mg/dL    CREATININE 3.0 (H) 0.7 - 1.2 mg/dL    GFR Non-African American 21 >=60 mL/min/1.73    GFR African American 25     Calcium 9.2 8.6 - 10.2 mg/dL    Total Protein 7.2 6.4 - 8.3 g/dL    Alb 4.3 3.5 - 5.2 g/dL    Total Bilirubin 0.9 0.0 - 1.2 mg/dL    Alkaline Phosphatase 127 40 - 129 U/L    ALT 31 0 - 40 U/L    AST 31 0 - 39 U/L   Urinalysis, reflex to microscopic   Result Value Ref Range    Color, UA Yellow Straw/Yellow    Clarity, UA Clear Clear    Glucose, Ur Negative Negative mg/dL    Bilirubin Urine Negative Negative    Ketones, Urine Negative Negative mg/dL    Specific Gravity, UA 1.020 1.005 - 1.030    Blood, Urine SMALL (A) Negative    pH, UA 5.5 5.0 - 9.0    Protein, UA TRACE Negative mg/dL    Urobilinogen, Urine 0.2 <2.0 E.U./dL    Nitrite, Urine Negative Negative    Leukocyte Esterase, Urine Negative Negative   PROTEIN, URINE, RANDOM   Result Value Ref Range    Protein, Ur 45 (H) 0 - 12 mg/dL   URINE ELECTROLYTES   Result Value Ref Range    Sodium, Ur 54 Not Established mmol/L    Potassium, Ur 24.5 Not Established mmol/L    Chloride 45 Not Established mmol/L   CREATININE, RANDOM URINE   Result Value Ref Range    Creatinine, Ur 105 40 - 278 mg/dL   Microscopic Urinalysis   Result Value Ref Range    Coarse Casts, UA 3-5 (A) 0 - 2 /LPF    WBC, UA 5-10 (A) 0 - 5 /HPF    RBC, UA 0-1 0 - 2 /HPF    Bacteria, UA RARE (A) None Seen /HPF    Amorphous, UA MODERATE        RADIOLOGY:  US RETROPERITONEAL COMPLETE   Final Result       Bilateral nephrolithiasis. No hydronephrosis.            ------------------------- NURSING NOTES AND VITALS REVIEWED ---------------------------  Date / Time Roomed:  9/4/2020 11:24 AM  ED Bed Assignment:  [id number]    The nursing notes within the ED encounter and vital signs as below have been reviewed.      Patient Vitals for the past 24 hrs:   BP Temp Temp src Pulse Resp SpO2 Height Weight   09/04/20 1521 (!) 231/94 98.3 °F (36.8 °C) Oral 68 16 99 % -- --   09/04/20 1452 (!) 196/75 97.7 °F (36.5 °C) Oral 55 16 98 % -- --   09/04/20 1346 (!) 186/78 -- -- 52 16 -- -- --   09/04/20 1120 (!) 212/90 98.3 °F (36.8 °C) -- 51 16 100 % 5' 11\" (1.803 m) 195 lb (88.5 kg)       Oxygen Saturation Interpretation: Normal    ------------------------------------------ PROGRESS NOTES ------------------------------------------  Re-evaluation(s):  Time: 36  Patients symptoms show no change  Repeat physical examination is not changed    Counseling:  I have spoken with the patient and discussed todays results, in addition to providing specific details for the plan of care and counseling regarding the diagnosis and prognosis. Their questions are answered at this time and they are agreeable with the plan of admission.    --------------------------------- ADDITIONAL PROVIDER NOTES ---------------------------------  Consultations:   Spoke with Dr. Bianka Jackson. Discussed case. They will admit the patient. This patient's ED course included: a personal history and physicial examination, re-evaluation prior to disposition and multiple bedside re-evaluations    This patient has remained hemodynamically stable during their ED course. Diagnosis:  1. Acute renal failure superimposed on chronic kidney disease, unspecified CKD stage, unspecified acute renal failure type ([facility] [address]        Disposition:  Patient's disposition: Admit to med/surg floor  Patient's condition is fair.             Oxana Flores DO  Resident  09/04/20 [id number]

## 2020-09-04 NOTE — ED NOTES
Pt SBAR faxed; Floor contacted to verify receipt of faxed transmission.      Cassandra Hernandez RN  09/04/20 [id number]

## 2020-09-05 LAB
ALBUMIN SERPL-MCNC: 3.9 G/DL (ref 3.5–5.2)
ALP BLD-CCNC: 108 U/L (ref 40–129)
ALT SERPL-CCNC: 25 U/L (ref 0–40)
ANION GAP SERPL CALCULATED.3IONS-SCNC: 8 MMOL/L (ref 7–16)
AST SERPL-CCNC: 24 U/L (ref 0–39)
BILIRUB SERPL-MCNC: 0.9 MG/DL (ref 0–1.2)
BUN BLDV-MCNC: 34 MG/DL (ref 8–23)
CALCIUM IONIZED: 1.24 MMOL/L (ref 1.15–1.33)
CALCIUM SERPL-MCNC: 8.9 MG/DL (ref 8.6–10.2)
CHLORIDE BLD-SCNC: 108 MMOL/L (ref 98–107)
CHOLESTEROL, TOTAL: 91 MG/DL (ref 0–199)
CO2: 23 MMOL/L (ref 22–29)
CREAT SERPL-MCNC: 2.7 MG/DL (ref 0.7–1.2)
FOLATE: >20 NG/ML (ref 4.8–24.2)
GFR AFRICAN AMERICAN: 28
GFR NON-AFRICAN AMERICAN: 23 ML/MIN/1.73
GLUCOSE BLD-MCNC: 95 MG/DL (ref 74–99)
HCT VFR BLD CALC: 36.6 % (ref 37–54)
HDLC SERPL-MCNC: 36 MG/DL
HEMOGLOBIN: 12.6 G/DL (ref 12.5–16.5)
LDL CHOLESTEROL CALCULATED: 38 MG/DL (ref 0–99)
MAGNESIUM: 2 MG/DL (ref 1.6–2.6)
MCH RBC QN AUTO: 33.7 PG (ref 26–35)
MCHC RBC AUTO-ENTMCNC: 34.4 % (ref 32–34.5)
MCV RBC AUTO: 97.9 FL (ref 80–99.9)
METER GLUCOSE: 110 MG/DL (ref 74–99)
METER GLUCOSE: 75 MG/DL (ref 74–99)
METER GLUCOSE: 81 MG/DL (ref 74–99)
METER GLUCOSE: 87 MG/DL (ref 74–99)
PARATHYROID HORMONE INTACT: 83 PG/ML (ref 15–65)
PDW BLD-RTO: 13.2 FL (ref 11.5–15)
PHOSPHORUS: 4.3 MG/DL (ref 2.5–4.5)
PLATELET # BLD: 115 E9/L (ref 130–450)
PMV BLD AUTO: 9.8 FL (ref 7–12)
POTASSIUM SERPL-SCNC: 3.7 MMOL/L (ref 3.5–5)
RBC # BLD: 3.74 E12/L (ref 3.8–5.8)
SODIUM BLD-SCNC: 139 MMOL/L (ref 132–146)
TRIGL SERPL-MCNC: 83 MG/DL (ref 0–149)
VITAMIN B-12: 1193 PG/ML (ref 211–946)
VITAMIN D 25-HYDROXY: 46 NG/ML (ref 30–100)
VLDLC SERPL CALC-MCNC: 17 MG/DL
WBC # BLD: 6.1 E9/L (ref 4.5–11.5)

## 2020-09-05 PROCEDURE — 84100 ASSAY OF PHOSPHORUS: CPT

## 2020-09-05 PROCEDURE — 86334 IMMUNOFIX E-PHORESIS SERUM: CPT

## 2020-09-05 PROCEDURE — 2580000003 HC RX 258: Performed by: STUDENT IN AN ORGANIZED HEALTH CARE EDUCATION/TRAINING PROGRAM

## 2020-09-05 PROCEDURE — 88300 SURGICAL PATH GROSS: CPT

## 2020-09-05 PROCEDURE — 6360000002 HC RX W HCPCS: Performed by: INTERNAL MEDICINE

## 2020-09-05 PROCEDURE — 99233 SBSQ HOSP IP/OBS HIGH 50: CPT | Performed by: INTERNAL MEDICINE

## 2020-09-05 PROCEDURE — 2060000000 HC ICU INTERMEDIATE R&B

## 2020-09-05 PROCEDURE — 82330 ASSAY OF CALCIUM: CPT

## 2020-09-05 PROCEDURE — 82365 CALCULUS SPECTROSCOPY: CPT

## 2020-09-05 PROCEDURE — 82962 GLUCOSE BLOOD TEST: CPT

## 2020-09-05 PROCEDURE — 83735 ASSAY OF MAGNESIUM: CPT

## 2020-09-05 PROCEDURE — 82746 ASSAY OF FOLIC ACID SERUM: CPT

## 2020-09-05 PROCEDURE — 80053 COMPREHEN METABOLIC PANEL: CPT

## 2020-09-05 PROCEDURE — 83970 ASSAY OF PARATHORMONE: CPT

## 2020-09-05 PROCEDURE — 82607 VITAMIN B-12: CPT

## 2020-09-05 PROCEDURE — 2580000003 HC RX 258: Performed by: FAMILY MEDICINE

## 2020-09-05 PROCEDURE — 36415 COLL VENOUS BLD VENIPUNCTURE: CPT

## 2020-09-05 PROCEDURE — 6370000000 HC RX 637 (ALT 250 FOR IP): Performed by: INTERNAL MEDICINE

## 2020-09-05 PROCEDURE — 85027 COMPLETE CBC AUTOMATED: CPT

## 2020-09-05 PROCEDURE — 84166 PROTEIN E-PHORESIS/URINE/CSF: CPT

## 2020-09-05 PROCEDURE — 84165 PROTEIN E-PHORESIS SERUM: CPT

## 2020-09-05 PROCEDURE — 6370000000 HC RX 637 (ALT 250 FOR IP): Performed by: FAMILY MEDICINE

## 2020-09-05 PROCEDURE — 80061 LIPID PANEL: CPT

## 2020-09-05 PROCEDURE — 82306 VITAMIN D 25 HYDROXY: CPT

## 2020-09-05 RX ORDER — HYDRALAZINE HYDROCHLORIDE 25 MG/1
37.5 TABLET, FILM COATED ORAL EVERY 8 HOURS SCHEDULED
Status: DISCONTINUED | OUTPATIENT
Start: 2020-09-05 | End: 2020-09-07 | Stop reason: HOSPADM

## 2020-09-05 RX ADMIN — MULTIPLE VITAMINS W/ MINERALS TAB 1 TABLET: TAB at 07:59

## 2020-09-05 RX ADMIN — GLIPIZIDE 2.5 MG: 5 TABLET ORAL at 06:21

## 2020-09-05 RX ADMIN — SODIUM CHLORIDE: 9 INJECTION, SOLUTION INTRAVENOUS at 22:53

## 2020-09-05 RX ADMIN — SODIUM CHLORIDE: 9 INJECTION, SOLUTION INTRAVENOUS at 06:00

## 2020-09-05 RX ADMIN — HYDRALAZINE HYDROCHLORIDE 25 MG: 25 TABLET, FILM COATED ORAL at 14:11

## 2020-09-05 RX ADMIN — HEPARIN SODIUM 5000 UNITS: 10000 INJECTION INTRAVENOUS; SUBCUTANEOUS at 20:26

## 2020-09-05 RX ADMIN — HEPARIN SODIUM 5000 UNITS: 10000 INJECTION INTRAVENOUS; SUBCUTANEOUS at 06:21

## 2020-09-05 RX ADMIN — SODIUM BICARBONATE 650 MG: 650 TABLET ORAL at 07:59

## 2020-09-05 RX ADMIN — SODIUM CHLORIDE, PRESERVATIVE FREE 10 ML: 5 INJECTION INTRAVENOUS at 08:00

## 2020-09-05 RX ADMIN — ASPIRIN 81 MG: 81 TABLET, COATED ORAL at 07:59

## 2020-09-05 RX ADMIN — PANTOPRAZOLE SODIUM 40 MG: 40 TABLET, DELAYED RELEASE ORAL at 06:21

## 2020-09-05 RX ADMIN — VITAMIN D 1000 UNITS: 25 TAB ORAL at 08:00

## 2020-09-05 RX ADMIN — METOPROLOL SUCCINATE 25 MG: 25 TABLET, EXTENDED RELEASE ORAL at 22:46

## 2020-09-05 RX ADMIN — ROSUVASTATIN CALCIUM 40 MG: 20 TABLET, FILM COATED ORAL at 07:59

## 2020-09-05 RX ADMIN — HYDRALAZINE HYDROCHLORIDE 37.5 MG: 25 TABLET, FILM COATED ORAL at 20:21

## 2020-09-05 RX ADMIN — HYDRALAZINE HYDROCHLORIDE 5 MG: 20 INJECTION INTRAMUSCULAR; INTRAVENOUS at 22:53

## 2020-09-05 RX ADMIN — SODIUM BICARBONATE 650 MG: 650 TABLET ORAL at 20:21

## 2020-09-05 RX ADMIN — CYANOCOBALAMIN TAB 1000 MCG 1000 MCG: 1000 TAB at 08:40

## 2020-09-05 RX ADMIN — HYDRALAZINE HYDROCHLORIDE 25 MG: 25 TABLET, FILM COATED ORAL at 06:21

## 2020-09-05 RX ADMIN — HEPARIN SODIUM 5000 UNITS: 10000 INJECTION INTRAVENOUS; SUBCUTANEOUS at 14:11

## 2020-09-05 ASSESSMENT — PAIN SCALES - GENERAL
PAINLEVEL_OUTOF10: 0

## 2020-09-05 NOTE — PROGRESS NOTES
Nephrology Progress Note  Patient's Name: Kristen Gallego  11:27 AM  9/5/2020    Nephrologist: Zeny Hilario    Reason for Consult:  THEODORA on CKD G3  Requesting Physician:  Alia Gastelum MD    Chief Complaint:  Elevated cr    History Obtained From:  patient and past medical records    History of Present Ilness:    Kristen Gallego is a 69 y.o. male with prior history CKD G3 with a  cr from 1.2-->1.4mg/dl. Pt seen by me in 2012 at which time he had a serum cr 1.5mg/dl. 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 Since ending the Chemo Rx, only issue infrequent kidney stone with out obstruction. Hx HTN for 10yrs with Hx CVA, No CAD/MI, no CHF. He has an 8 yr Hx DM2 no retinopathy but Neuropathy. His most recent creatinine in April 2020 2.0mg/dl. Dr. Josephine Barney called 8/31/20 with the cr up to 2.5mg/dl and the lisinopril was stopped. A repeat cr 2 days ago up to 2.9mg/dl and in the ED today the cr up to 3.0mg/dl. Pt states over the recent past few days including today he has passed numerous kidney stones. No associated pain fever or chills. //94 since admission. He denies blurry vision, double vision, flushing, CP or SOB    9/5/20: Seen and examined in room. States he is feeling well. Denies CP or SOB.  Cr improving with IVF      Past Medical History:   Diagnosis Date    Cancer [facility]) 2017    esophageal    [facility]     Diabetes mellitus [facility]     GERD (gastroesophageal reflux disease)     Hyperlipidemia     Hypertension     TIA (transient ischemic attack)     Vocal cord paralysis, unilateral complete 02/16/2018       Past Surgical History:   Procedure Laterality Date    DIAGNOSTIC CARDIAC CATH LAB PROCEDURE      ESOPHAGECTOMY N/A 02/12/2018 [facility], Dr. Marielos Mancuso         Family History   Problem Relation Age of Onset    Cancer Mother     Cancer Father     Other Brother         brain tumor        reports that he has never smoked. He has never used smokeless tobacco. He reports that he does not drink alcohol or use drugs. Allergies:  Lipitor [atorvastatin]    Current Medications:    0.9 % sodium chloride infusion, Continuous  metoprolol succinate (TOPROL XL) extended release tablet 25 mg, Nightly  hydrALAZINE (APRESOLINE) injection 5 mg, Q4H PRN  labetalol (NORMODYNE;TRANDATE) injection 10 mg, Q4H PRN  hydrALAZINE (APRESOLINE) tablet 25 mg, 3 times per day  heparin (porcine) injection 5,000 Units, 3 times per day  acetaminophen (TYLENOL) tablet 650 mg, Q4H PRN  glucose (GLUTOSE) 40 % oral gel 15 g, PRN  dextrose 50 % IV solution, PRN  glucagon (rDNA) injection 1 mg, PRN  dextrose 5 % solution, PRN  sodium chloride flush 0.9 % injection 10 mL, 2 times per day  sodium chloride flush 0.9 % injection 10 mL, PRN  polyethylene glycol (GLYCOLAX) packet 17 g, Daily PRN  aspirin EC tablet 81 mg, Daily  glipiZIDE (GLUCOTROL) tablet 2.5 mg, QAM AC  therapeutic multivitamin-minerals 1 tablet, Daily  vitamin B-12 (CYANOCOBALAMIN) tablet 1,000 mcg, Daily  rosuvastatin (CRESTOR) tablet 40 mg, Daily  Vitamin D (CHOLECALCIFEROL) tablet 1,000 Units, Daily  sodium bicarbonate tablet 650 mg, BID  pantoprazole (PROTONIX) tablet 40 mg, QAM AC        Review of Systems:   Pertinent items are noted in HPI. Remainder of a complete review of systems is (-) other than stated    Physical exam:   Constitutional:  Elderly male in NAD  Vitals: VITALS:  BP (!) 141/65   Pulse 65   Temp 97 °F (36.1 °C) (Oral)   Resp 16   Ht 5' 11\" (1.803 m)   Wt 195 lb 8 oz (88.7 kg)   SpO2 100%   BMI 27.27 kg/m²   24HR INTAKE/OUTPUT:      Intake/Output Summary (Last 24 hours) at 9/5/2020 1127  Last data filed at 9/5/2020 1104  Gross per 24 hour   Intake 801 ml   Output 1225 ml   Net -424 ml     URINARY CATHETER OUTPUT (Mishra):     DRAIN/TUBE OUTPUT:     VENT SETTINGS:  Vent Information  SpO2: 100 %  Additional Respiratory  Assessments  Pulse: 65  Resp: 16  SpO2: 100 %    Skin: no rash, turgor wnl  Heent:  eomi, mmm  Neck: no bruits or jvd noted  Cardiovascular:  PMI not lat displaced S1, S2 without S3/S4 or rub  Respiratory: CTA B without w/r/r  Abdomen:  +bs, soft, nt, nd  Ext: (-) bilat  lower extremity edema  Psychiatric: mood and affect appropriate, cr nr 2-12 grossly intact  Musculoskeletal:  Rom, muscular strength intact    Data:   Labs:  CBC:   Lab Results   Component Value Date    WBC 6.1 09/05/2020    RBC 3.74 09/05/2020    HGB 12.6 09/05/2020    HCT 36.6 09/05/2020    MCV 97.9 09/05/2020    MCH 33.7 09/05/2020    MCHC 34.4 09/05/2020    RDW 13.2 09/05/2020     09/05/2020    MPV 9.8 09/05/2020     CBC with Differential:    Lab Results   Component Value Date    WBC 6.1 09/05/2020    RBC 3.74 09/05/2020    HGB 12.6 09/05/2020    HCT 36.6 09/05/2020     09/05/2020    MCV 97.9 09/05/2020    MCH 33.7 09/05/2020    MCHC 34.4 09/05/2020    RDW 13.2 09/05/2020    NRBC 0.0 01/25/2018    METASPCT 0.9 01/25/2018    LYMPHOPCT 28.7 09/04/2020    MONOPCT 8.5 09/04/2020    MYELOPCT 0.9 01/25/2018    BASOPCT 0.7 09/04/2020    MONOSABS 0.61 09/04/2020    LYMPHSABS 2.06 09/04/2020    EOSABS 0.31 09/04/2020    BASOSABS 0.05 09/04/2020     Hemoglobin/Hematocrit:    Lab Results   Component Value Date    HGB 12.6 09/05/2020    HCT 36.6 09/05/2020     CMP:    Lab Results   Component Value Date     09/05/2020    K 3.7 09/05/2020    K 3.9 09/04/2020     09/05/2020    CO2 23 09/05/2020    BUN 34 09/05/2020    CREATININE 2.7 09/05/2020    GFRAA 28 09/05/2020    LABGLOM 23 09/05/2020    GLUCOSE 95 09/05/2020    GLUCOSE 136 06/17/2011    PROT 7.2 09/04/2020    LABALBU 3.9 09/05/2020    CALCIUM 8.9 09/05/2020    BILITOT 0.9 09/05/2020 ALKPHOS 108 09/05/2020    AST 24 09/05/2020    ALT 25 09/05/2020     BMP:    Lab Results   Component Value Date     09/05/2020    K 3.7 09/05/2020    K 3.9 09/04/2020     09/05/2020    CO2 23 09/05/2020    BUN 34 09/05/2020    LABALBU 3.9 09/05/2020    CREATININE 2.7 09/05/2020    CALCIUM 8.9 09/05/2020    GFRAA 28 09/05/2020    LABGLOM 23 09/05/2020    GLUCOSE 95 09/05/2020    GLUCOSE 136 06/17/2011     BUN/Creatinine:    Lab Results   Component Value Date    BUN 34 09/05/2020    CREATININE 2.7 09/05/2020     Hepatic Function Panel:    Lab Results   Component Value Date    ALKPHOS 108 09/05/2020    ALT 25 09/05/2020    AST 24 09/05/2020    PROT 7.2 09/04/2020    BILITOT 0.9 09/05/2020    BILIDIR 0.3 01/23/2018    IBILI 1.0 01/23/2018    LABALBU 3.9 09/05/2020     Albumin:    Lab Results   Component Value Date    LABALBU 3.9 09/05/2020     Calcium:    Lab Results   Component Value Date    CALCIUM 8.9 09/05/2020     Ionized Calcium:  No results found for: IONCA  Magnesium:    Lab Results   Component Value Date    MG 2.0 09/05/2020     Phosphorus:    Lab Results   Component Value Date    PHOS 4.3 09/05/2020     LDH:    Lab Results   Component Value Date     09/04/2020     Uric Acid:  No results found for: LABURIC, URICACID  PT/INR:    Lab Results   Component Value Date    PROTIME 15.4 01/23/2018    PROTIME 12.1 06/17/2011    INR 1.4 01/23/2018     PTT:    Lab Results   Component Value Date    APTT 27.2 06/17/2011   [APTT}  Troponin:    Lab Results   Component Value Date    TROPONINI <0.01 06/17/2011     U/A:    Lab Results   Component Value Date    COLORU Yellow 09/04/2020    PROTEINU TRACE 09/04/2020    PHUR 5.5 09/04/2020    LABCAST FEW 01/23/2018    WBCUA 5-10 09/04/2020    WBCUA 0-1 06/17/2011    RBCUA 0-1 09/04/2020    RBCUA 5-10 06/17/2011    MUCUS Present 01/23/2018    BACTERIA RARE 09/04/2020    CLARITYU Clear 09/04/2020    SPECGRAV 1.020 09/04/2020    LEUKOCYTESUR Negative 09/04/2020 UROBILINOGEN 0.2 2020    BILIRUBINUR Negative 2020    BILIRUBINUR NEGATIVE 2011    BLOODU SMALL 2020    GLUCOSEU Negative 2020    GLUCOSEU NEGATIVE 2011    AMORPHOUS MODERATE 2020     ABG:  No results found for: PH, PCO2, PO2, HCO3, BE, THGB, TCO2, O2SAT  HgBA1c:  No results found for: LABA1C  Microalbumen/Creatinine ratio:  No components found for: RUCREAT  FLP:    Lab Results   Component Value Date    TRIG 83 2020    HDL 36 2020    LDLCALC 38 2020    LABVLDL 17 2020     TSH:    Lab Results   Component Value Date    TSH 0.365 2018     VITAMIN B12: No components found for: B12  FOLATE:    Lab Results   Component Value Date    FOLATE >20.0 2020     Iron Saturation:  No components found for: PERCENTFE  FERRITIN:    Lab Results   Component Value Date    FERRITIN 598 2018     PAOLO:  No results found for: ANATITER, PAOLO  AMYLASE:  No results found for: AMYLASE  LIPASE:  No results found for: LIPASE  24 Hour Urine for Protein:  No components found for: RAWUPRO, UHRS3, ZYNT47VK, UTV3  24 Hour Urine for Creatinine Clearance:  No components found for: Elenore Gals, UTV10     Imaging:  Patient MRN:  [id number]    : 1950    Age: 69 years    Gender: Male    Order Date:  2020 12:30 PM    EXAM: US RETROPERITONEAL COMPLETE    INDICATION:  THEODORA    THEODORA    COMPARISON: None         FINDINGS:         The kidneys are normal in size, contour and echogenicity. The right    kidney measures 9.4 x 4.7 x 5.5 cm. The left kidney measures 9.7 x 3.9    x 5.2 cm.         Bilateral nonobstructive renal calculi are seen. 10 mm calculus is    seen within an inferior pole calyx of the right kidney. An 18 mm    calculus is also seen within an inferior pole calyx of the left    kidney. No hydronephrosis is seen.  Small cysts are seen along the    upper and lower poles of the left kidney.         The urinary bladder is decompressed  but otherwise grossly unremarkable.              Impression          Bilateral nephrolithiasis. No hydronephrosis. Assessment  1-Stage II THEODORA with a FeNa 1.09% leading away from decreased effective renal perfusion as the etiology-acute issue may reflect the uncontrolled HTN  Renal US no hydro  UA small blood, protein trace, (+) casts, WBC 5-10 rare angie, LE (-), Ni (-)  Urine protein:Cr 0.43 which does not suggest Nephrotic Syndrome  Cr improving with IVF  Cr 3.0->2.7  PLAN:1. Continue IVF for now  2-Await results of basic serologies  3. Continue off ACEI    2- HTN with CKD I-IV with HTN Urgency  BP near goal <130/80  Metoprolol resumed, PO hydralazine started on 9/4  PLAN:1. BP improved, continue current medications    3. CKD G3 with a  cr from 1.2-->1.4mg/dl presumed sec to microvascular disease and a Hx of Nephrolithiasis and HTN  PLAN:1. Strain urine for stone to send for sample    4. Sec HPTH of Renal Origin  Ca++, iCa++ and PO4 WNL  PLAN:1. Await results of PTH, Vit D    5. Thrombocytopenia  With the Hx of the Cancer and uncontrolled HTN and the THEODORA will R/O hemolysis  Haptoglobin 122,   PLAN:1. Follow PLT    Thank you Dr. Jose Jarrell MD for allowing us to participate in care of 3 [facility] Shan Mcbride, APRN - NP  11:27 AM  9/5/2020   Pt seen and examined and states he feels well. He has continued to pass small stones-painless without gross hematuria  All labs reviewed    A/P1. Stage II THEODORA-cr trending down-spoke with nursing to have stone  sent for analysis    2. HTN  With CKD I-IV-BP remains above goal  PLAN:1. Increase hydralazine to 37.5mg tid    3. Sec HPTH of Renal origin  PTH 83 Vit D 46    4.  Thrombocytopenia  -->115 LDH elevated but haptoglobin not low to suggest hemolysis    Agree with the remainder as above    COLBY Rao MD

## 2020-09-05 NOTE — H&P
[id number] [address]                              HISTORY AND PHYSICAL    PATIENT NAME: Alba Diallo                    :        1950  MED REC NO:   [id number]                            ROOM:       0404  ACCOUNT NO:   [de-identified]                           ADMIT DATE: 2020  PROVIDER:     Georges Fontenot MD    DATE OF ADMISSION:  2020    CHIEF COMPLAINT:  Decreased renal function. HISTORY OF PRESENT ILLNESS:  The patient is a 65-year-old white male who  had some routine blood work done in my office, noted to have an elevated  creatinine above his baseline, approximately 2.5. Subsequently, recheck  several days later after starting his lisinopril per my discussion with  Dr. Shannon Tomlin, the creatinine glenna to 2.9. Per my discussion with Dr. Shannon Tomlin again, he was instructed to come to the emergency room where he  was found to have a creatinine of 3.0. He had an ultrasound done, which  showed no obstruction. Because of the fairly rapid progression of his  renal insufficiency, he was admitted for further evaluation and  treatment. PAST SURGICAL HISTORY:  Esophageal surgery in the past for cancer, also  some type of pelvic mass in the past which was benign, also  tonsillectomy. HOSPITALIZATIONS:  None recently. ALLERGIES:  No known allergies. PAST MEDICAL HISTORY:  Medical problems include diabetes, hypertension,  chronic renal insufficiency, coronary artery disease, vitamin D  deficiency, hyperlipidemia, benign prostatic hypertrophy, history of  esophageal cancer. MEDICATIONS:  Sodium bicarbonate, multivitamin, vitamin D, vitamin B12,  Glucotrol, Prevacid, aspirin, Toprol, and Crestor. SOCIAL HISTORY:  Lives with his wife. Nonsmoker. No alcohol or drug  use. FAMILY HISTORY:  Positive for diabetes mellitus, lung cancer, brain  cancer, and vulvar cancer.     REVIEW OF SYSTEMS: Basically unremarkable. PHYSICAL EXAMINATION:  GENERAL:  Cooperative male in no acute distress. HEENT:  Atraumatic, normocephalic. TMs are unremarkable. No nasal  discharge. Pupils are equal and reactive to light bilaterally. Throat  without erythema or exudate. NECK:  Supple. No JVD or carotid bruit. Thyroid without enlargement or  nodule. CHEST:  Clear to auscultation. HEART:  Regular rate and rhythm without murmur. ABDOMEN:  Soft. No masses, tenderness, or organomegaly. RECTAL:  Deferred. EXTREMITIES:  Able to move all well. No dependent edema. NEUROLOGIC:  No gross or focal deficits. ASSESSMENT:  Acute-on-chronic renal insufficiency. SECONDARY DIAGNOSES include hypertensive urgency, diabetes mellitus,  hypertension, chronic renal insufficiency, coronary artery disease,  vitamin D deficiency, hyperlipidemia, BPH, history of esophageal cancer. PLAN:  Admit per orders.         Leno Lee MD    D: 09/04/2020 16:55:23       T: 09/04/2020 17:05:01     [id number]  Job#: [id number]     Doc#: [id number]    CC:

## 2020-09-05 NOTE — PLAN OF CARE
Problem: Pain:  Goal: Pain level will decrease  Description: Pain level will decrease  9/5/2020 0452 by Dae Alicea RN  Outcome: Met This Shift  9/5/2020 0213 by Alejandro Dolan RN  Outcome: Met This Shift  9/4/2020 1521 by Reba Armando RN  Outcome: Met This Shift  Goal: Control of acute pain  Description: Control of acute pain  9/5/2020 0452 by Dae Alicea RN  Outcome: Met This Shift  9/5/2020 0213 by Alejandro Dolan RN  Outcome: Met This Shift  9/4/2020 1521 by Reba Armando RN  Outcome: Ongoing  Goal: Control of chronic pain  Description: Control of chronic pain  9/5/2020 0452 by Dae Alicea RN  Outcome: Met This Shift  9/5/2020 0213 by Alejandro Dolan RN  Outcome: Met This Shift  9/4/2020 1521 by Reba Armando RN  Outcome: Met This Shift     Problem: Falls - Risk of:  Goal: Will remain free from falls  Description: Will remain free from falls  9/5/2020 0452 by Dae Alicea RN  Outcome: Met This Shift  9/5/2020 0213 by Alejandro Dolan RN  Outcome: Met This Shift  9/4/2020 1521 by Reba Armando RN  Outcome: Met This Shift  Goal: Absence of physical injury  Description: Absence of physical injury  9/5/2020 0452 by Dea Alicea RN  Outcome: Met This Shift  9/5/2020 0213 by Alejandro Dolan RN  Outcome: Met This Shift  9/4/2020 1521 by Reba Armando RN  Outcome: Met This Shift     Problem: Falls - Risk of:  Goal: Will remain free from falls  Description: Will remain free from falls  9/5/2020 0452 by Dae Alicea RN  Outcome: Met This Shift  9/5/2020 0213 by Alejandro Dolan RN  Outcome: Met This Shift  9/4/2020 1521 by Reba Armando RN  Outcome: Met This Shift  Goal: Absence of physical injury  Description: Absence of physical injury  9/5/2020 0452 by Dae Alicea RN  Outcome: Met This Shift  9/5/2020 0213 by Alejandro Dolan RN  Outcome: Met This Shift  9/4/2020 1521 by Reba Armando RN  Outcome: Met This Shift     Problem: Fluid and Electrolyte Imbalance  Goal: Fluid and electrolyte balance are

## 2020-09-05 NOTE — PROGRESS NOTES
[facility] Progress Note    Admitting Date and Time: 9/4/2020 11:24 AM  Admit Dx: Acute kidney injury superimposed on chronic kidney disease ([facility] [N17.9, N18.9]  Acute kidney injury superimposed on chronic kidney disease ([facility] [N17.9, N18.9]    Subjective:  Patient is being followed for Acute kidney injury superimposed on chronic kidney disease ([facility] [N17.9, N18.9]  Acute kidney injury superimposed on chronic kidney disease [facility] [N17.9, N18.9]   Pt feels fine, has no complaints    ROS: denies fever, chills, cp, sob, n/v, HA unless stated above.       metoprolol succinate  25 mg Oral Nightly    hydrALAZINE  25 mg Oral 3 times per day    heparin (porcine)  5,000 Units Subcutaneous 3 times per day    sodium chloride flush  10 mL Intravenous 2 times per day    aspirin EC  81 mg Oral Daily    glipiZIDE  2.5 mg Oral QAM AC    therapeutic multivitamin-minerals  1 tablet Oral Daily    vitamin B-12  1,000 mcg Oral Daily    rosuvastatin  40 mg Oral Daily    Vitamin D  1,000 Units Oral Daily    sodium bicarbonate  650 mg Oral BID    pantoprazole  40 mg Oral QAM AC     hydrALAZINE, 5 mg, Q4H PRN  labetalol, 10 mg, Q4H PRN  acetaminophen, 650 mg, Q4H PRN  glucose, 15 g, PRN  dextrose, 12.5 g, PRN  glucagon (rDNA), 1 mg, PRN  dextrose, 100 mL/hr, PRN  sodium chloride flush, 10 mL, PRN  polyethylene glycol, 17 g, Daily PRN         Objective:    BP (!) 141/65   Pulse 65   Temp 97 °F (36.1 °C) (Oral)   Resp 16   Ht 5' 11\" (1.803 m)   Wt 195 lb 8 oz (88.7 kg)   SpO2 100%   BMI 27.27 kg/m²     General Appearance: alert and oriented to person, place and time and in no acute distress  Skin: warm and dry  Head: normocephalic and atraumatic  Eyes: pupils equal, round, and reactive to light, extraocular eye movements intact, conjunctivae normal  Neck: neck supple and non tender without mass   Pulmonary/Chest: clear to auscultation bilaterally- no wheezes, rales or rhonchi, normal air movement, no respiratory distress  Cardiovascular: normal rate, normal S1 and S2 and no carotid bruits  Abdomen: soft, non-tender, non-distended, normal bowel sounds, no masses or organomegaly  Extremities: no cyanosis, no clubbing and no edema  Neurologic: no cranial nerve deficit and speech normal        Recent Labs     09/04/20  1147 09/05/20  0613    139   K 3.9 3.7   * 108*   CO2 24 23   BUN 39* 34*   CREATININE 3.0* 2.7*   GLUCOSE 104* 95   CALCIUM 9.2 8.9       Recent Labs     09/04/20  1147 09/05/20  0613   WBC 7.2 6.1   RBC 4.13 3.74*   HGB 14.0 12.6   HCT 40.1 36.6*   MCV 97.1 97.9   MCH 33.9 33.7   MCHC 34.9* 34.4   RDW 13.2 13.2   * 115*   MPV 9.5 9.8           Assessment:    Principal Problem:    Acute kidney injury superimposed on chronic kidney disease (HCC)  Active Problems:    Hypertension    Coronary atherosclerosis of native coronary artery    Esophageal cancer (HCC)    Type 2 diabetes mellitus (HCC)    HLD (hyperlipidemia)    Chronic renal insufficiency    Hypertensive urgency  Resolved Problems:    * No resolved hospital problems. *      Plan:  1. Acute renal failure on CKD stage III, baseline creatinine around 1.2-1.4, present with creatinine of 3, might be due to hypertensive urgency/emergency, appreciate nephrology input, start patient on IV fluids, creatinine trending down to 2.7, continue monitoring. 2.  History of hypertension, patient presented with blood pressure 212/90, patient was started on hydralazine and labetalol IV as needed, started on oral hydralazine and continued on clonidine 0.1 mg.  3.  Thrombocytopenia, mild platelet count at 053, continue monitoring. 4.  Diabetes type 2: Patient was continued on glipizide  5. History of hyperlipidemia, continue on Crestor. NOTE: This report was transcribed using voice recognition software. Every effort was made to ensure accuracy; however, inadvertent computerized transcription errors may be present.   Electronically signed by Camden Tolliver MD on 9/5/2020 at 9:01 AM

## 2020-09-06 LAB
ALBUMIN SERPL-MCNC: 3.4 G/DL (ref 3.5–5.2)
ALP BLD-CCNC: 102 U/L (ref 40–129)
ALT SERPL-CCNC: 24 U/L (ref 0–40)
ANION GAP SERPL CALCULATED.3IONS-SCNC: 11 MMOL/L (ref 7–16)
AST SERPL-CCNC: 25 U/L (ref 0–39)
BILIRUB SERPL-MCNC: 0.8 MG/DL (ref 0–1.2)
BUN BLDV-MCNC: 30 MG/DL (ref 8–23)
CALCIUM IONIZED: 1.22 MMOL/L (ref 1.15–1.33)
CALCIUM SERPL-MCNC: 8.9 MG/DL (ref 8.6–10.2)
CHLORIDE BLD-SCNC: 109 MMOL/L (ref 98–107)
CO2: 19 MMOL/L (ref 22–29)
CREAT SERPL-MCNC: 2.7 MG/DL (ref 0.7–1.2)
GFR AFRICAN AMERICAN: 28
GFR NON-AFRICAN AMERICAN: 23 ML/MIN/1.73
GLUCOSE BLD-MCNC: 95 MG/DL (ref 74–99)
HBV SURFACE AB TITR SER: NORMAL {TITER}
HCT VFR BLD CALC: 34.4 % (ref 37–54)
HEMOGLOBIN: 12.1 G/DL (ref 12.5–16.5)
HEPATITIS B SURFACE ANTIGEN INTERPRETATION: NORMAL
HEPATITIS C ANTIBODY INTERPRETATION: NORMAL
MAGNESIUM: 1.9 MG/DL (ref 1.6–2.6)
MCH RBC QN AUTO: 33.9 PG (ref 26–35)
MCHC RBC AUTO-ENTMCNC: 35.2 % (ref 32–34.5)
MCV RBC AUTO: 96.4 FL (ref 80–99.9)
METER GLUCOSE: 105 MG/DL (ref 74–99)
METER GLUCOSE: 111 MG/DL (ref 74–99)
METER GLUCOSE: 132 MG/DL (ref 74–99)
METER GLUCOSE: 86 MG/DL (ref 74–99)
PDW BLD-RTO: 13 FL (ref 11.5–15)
PHOSPHORUS: 3.3 MG/DL (ref 2.5–4.5)
PLATELET # BLD: 107 E9/L (ref 130–450)
PMV BLD AUTO: 9.5 FL (ref 7–12)
POTASSIUM SERPL-SCNC: 3.6 MMOL/L (ref 3.5–5)
RBC # BLD: 3.57 E12/L (ref 3.8–5.8)
SODIUM BLD-SCNC: 139 MMOL/L (ref 132–146)
TOTAL PROTEIN: 6 G/DL (ref 6.4–8.3)
WBC # BLD: 7.8 E9/L (ref 4.5–11.5)

## 2020-09-06 PROCEDURE — 6370000000 HC RX 637 (ALT 250 FOR IP): Performed by: INTERNAL MEDICINE

## 2020-09-06 PROCEDURE — 2580000003 HC RX 258: Performed by: FAMILY MEDICINE

## 2020-09-06 PROCEDURE — 6370000000 HC RX 637 (ALT 250 FOR IP): Performed by: FAMILY MEDICINE

## 2020-09-06 PROCEDURE — 6360000002 HC RX W HCPCS: Performed by: INTERNAL MEDICINE

## 2020-09-06 PROCEDURE — 2060000000 HC ICU INTERMEDIATE R&B

## 2020-09-06 PROCEDURE — 36415 COLL VENOUS BLD VENIPUNCTURE: CPT

## 2020-09-06 PROCEDURE — 82962 GLUCOSE BLOOD TEST: CPT

## 2020-09-06 PROCEDURE — 85027 COMPLETE CBC AUTOMATED: CPT

## 2020-09-06 PROCEDURE — 83735 ASSAY OF MAGNESIUM: CPT

## 2020-09-06 PROCEDURE — 82330 ASSAY OF CALCIUM: CPT

## 2020-09-06 PROCEDURE — 2580000003 HC RX 258: Performed by: STUDENT IN AN ORGANIZED HEALTH CARE EDUCATION/TRAINING PROGRAM

## 2020-09-06 PROCEDURE — 80053 COMPREHEN METABOLIC PANEL: CPT

## 2020-09-06 PROCEDURE — 99232 SBSQ HOSP IP/OBS MODERATE 35: CPT | Performed by: INTERNAL MEDICINE

## 2020-09-06 PROCEDURE — 84100 ASSAY OF PHOSPHORUS: CPT

## 2020-09-06 RX ORDER — NIFEDIPINE 30 MG/1
30 TABLET, EXTENDED RELEASE ORAL DAILY
Status: DISCONTINUED | OUTPATIENT
Start: 2020-09-06 | End: 2020-09-07 | Stop reason: HOSPADM

## 2020-09-06 RX ADMIN — GLIPIZIDE 2.5 MG: 5 TABLET ORAL at 05:56

## 2020-09-06 RX ADMIN — HEPARIN SODIUM 5000 UNITS: 10000 INJECTION INTRAVENOUS; SUBCUTANEOUS at 21:03

## 2020-09-06 RX ADMIN — ASPIRIN 81 MG: 81 TABLET, COATED ORAL at 09:02

## 2020-09-06 RX ADMIN — SODIUM BICARBONATE 650 MG: 650 TABLET ORAL at 21:02

## 2020-09-06 RX ADMIN — METOPROLOL SUCCINATE 25 MG: 25 TABLET, EXTENDED RELEASE ORAL at 21:02

## 2020-09-06 RX ADMIN — PANTOPRAZOLE SODIUM 40 MG: 40 TABLET, DELAYED RELEASE ORAL at 05:56

## 2020-09-06 RX ADMIN — NIFEDIPINE 30 MG: 30 TABLET, EXTENDED RELEASE ORAL at 09:02

## 2020-09-06 RX ADMIN — HYDRALAZINE HYDROCHLORIDE 37.5 MG: 25 TABLET, FILM COATED ORAL at 21:02

## 2020-09-06 RX ADMIN — CYANOCOBALAMIN TAB 1000 MCG 1000 MCG: 1000 TAB at 09:02

## 2020-09-06 RX ADMIN — HEPARIN SODIUM 5000 UNITS: 10000 INJECTION INTRAVENOUS; SUBCUTANEOUS at 13:30

## 2020-09-06 RX ADMIN — HYDRALAZINE HYDROCHLORIDE 37.5 MG: 25 TABLET, FILM COATED ORAL at 05:57

## 2020-09-06 RX ADMIN — ROSUVASTATIN CALCIUM 40 MG: 20 TABLET, FILM COATED ORAL at 09:02

## 2020-09-06 RX ADMIN — VITAMIN D 1000 UNITS: 25 TAB ORAL at 09:02

## 2020-09-06 RX ADMIN — SODIUM CHLORIDE: 9 INJECTION, SOLUTION INTRAVENOUS at 13:04

## 2020-09-06 RX ADMIN — SODIUM CHLORIDE, PRESERVATIVE FREE 10 ML: 5 INJECTION INTRAVENOUS at 09:02

## 2020-09-06 RX ADMIN — MULTIPLE VITAMINS W/ MINERALS TAB 1 TABLET: TAB at 09:02

## 2020-09-06 RX ADMIN — HYDRALAZINE HYDROCHLORIDE 37.5 MG: 25 TABLET, FILM COATED ORAL at 13:14

## 2020-09-06 RX ADMIN — HEPARIN SODIUM 5000 UNITS: 10000 INJECTION INTRAVENOUS; SUBCUTANEOUS at 06:00

## 2020-09-06 RX ADMIN — SODIUM CHLORIDE, PRESERVATIVE FREE 10 ML: 5 INJECTION INTRAVENOUS at 21:02

## 2020-09-06 RX ADMIN — SODIUM BICARBONATE 650 MG: 650 TABLET ORAL at 09:02

## 2020-09-06 ASSESSMENT — PAIN SCALES - GENERAL
PAINLEVEL_OUTOF10: 0

## 2020-09-06 NOTE — PROGRESS NOTES
Nephrology Progress Note  Patient's Name: Marcio Hayes  10:34 AM  9/6/2020    Nephrologist: Eileen Driscoll    Reason for Consult:  THEODORA on CKD G3  Requesting Physician:  Morales Tay MD    Chief Complaint:  Elevated cr    History Obtained From:  patient and past medical records    History of Present Ilness:    Marcio Hayes is a 69 y.o. male with prior history CKD G3 with a  cr from 1.2-->1.4mg/dl. Pt seen by me in 2012 at which time he had a serum cr 1.5mg/dl. 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 Since ending the Chemo Rx, only issue infrequent kidney stone with out obstruction. Hx HTN for 10yrs with Hx CVA, No CAD/MI, no CHF. He has an 8 yr Hx DM2 no retinopathy but Neuropathy. His most recent creatinine in April 2020 2.0mg/dl. Dr. Wisam Romero called 8/31/20 with the cr up to 2.5mg/dl and the lisinopril was stopped. A repeat cr 2 days ago up to 2.9mg/dl and in the ED today the cr up to 3.0mg/dl. Pt states over the recent past few days including today he has passed numerous kidney stones. No associated pain fever or chills. //94 since admission. He denies blurry vision, double vision, flushing, CP or SOB    9/6/20: Resting in bed, denies any complaints. Continues to pass kidney stones.        Past Medical History:   Diagnosis Date    Cancer [facility]) 2017    esophageal    [facility]ere[facility] artery occlusion [facility] infarction [facility]     Diabetes mellitus ([facility]     GERD (gastroesophageal reflux disease)     Hyperlipidemia     Hypertension     TIA (transient ischemic attack)     Vocal cord paralysis, unilateral complete 02/16/2018       Past Surgical History:   Procedure Laterality Date    DIAGNOSTIC CARDIAC CATH LAB PROCEDURE      ESOPHAGECTOMY N/A 02/12/2018    [facility], Dr. Cesario Endo         Family History   Problem Relation Age of Onset    Cancer Mother     Cancer Father     Other Brother         brain tumor        reports that he has never smoked. He has never used smokeless tobacco. He reports that he does not drink alcohol or use drugs. Allergies:  Lipitor [atorvastatin]    Current Medications:    NIFEdipine (PROCARDIA XL) extended release tablet 30 mg, Daily  hydrALAZINE (APRESOLINE) tablet 37.5 mg, 3 times per day  0.9 % sodium chloride infusion, Continuous  metoprolol succinate (TOPROL XL) extended release tablet 25 mg, Nightly  hydrALAZINE (APRESOLINE) injection 5 mg, Q4H PRN  labetalol (NORMODYNE;TRANDATE) injection 10 mg, Q4H PRN  heparin (porcine) injection 5,000 Units, 3 times per day  acetaminophen (TYLENOL) tablet 650 mg, Q4H PRN  glucose (GLUTOSE) 40 % oral gel 15 g, PRN  dextrose 50 % IV solution, PRN  glucagon (rDNA) injection 1 mg, PRN  dextrose 5 % solution, PRN  sodium chloride flush 0.9 % injection 10 mL, 2 times per day  sodium chloride flush 0.9 % injection 10 mL, PRN  polyethylene glycol (GLYCOLAX) packet 17 g, Daily PRN  aspirin EC tablet 81 mg, Daily  glipiZIDE (GLUCOTROL) tablet 2.5 mg, QAM AC  therapeutic multivitamin-minerals 1 tablet, Daily  vitamin B-12 (CYANOCOBALAMIN) tablet 1,000 mcg, Daily  rosuvastatin (CRESTOR) tablet 40 mg, Daily  Vitamin D (CHOLECALCIFEROL) tablet 1,000 Units, Daily  sodium bicarbonate tablet 650 mg, BID  pantoprazole (PROTONIX) tablet 40 mg, QAM AC        Review of Systems:   Pertinent items are noted in HPI. Remainder of a complete review of systems is (-) other than stated    Physical exam:   Constitutional:  Elderly male in NAD  Vitals: VITALS:  /69   Pulse 55   Temp 98.4 °F (36.9 °C) (Oral)   Resp 16   Ht 5' 11\" (1.803 m)   Wt 197 lb 8 oz (89.6 kg)   SpO2 97%   BMI 27.55 kg/m²   24HR INTAKE/OUTPUT:      Intake/Output Summary (Last 24 hours) at 9/6/2020 [address] [place] [address] filed at 9/6/2020 0845  Gross per 24 hour   Intake 2799.4 ml   Output 2625 ml   Net 174.4 ml     URINARY CATHETER OUTPUT (Mishra):     DRAIN/TUBE OUTPUT:     VENT SETTINGS:  Vent Information  SpO2: 97 %  Additional Respiratory  Assessments  Pulse: 55  Resp: 16  SpO2: 97 %    Skin: no rash, turgor wnl  Heent:  eomi, mmm  Neck: no bruits or jvd noted  Cardiovascular:  PMI not lat displaced S1, S2 without S3/S4 or rub  Respiratory: CTA B without w/r/r  Abdomen:  +bs, soft, nt, nd  Ext: (-) bilat  lower extremity edema  Psychiatric: mood and affect appropriate, cr nr 2-12 grossly intact  Musculoskeletal:  Rom, muscular strength intact    Data:   Labs:  CBC:   Lab Results   Component Value Date    WBC 7.8 09/06/2020    RBC 3.57 09/06/2020    HGB 12.1 09/06/2020    HCT 34.4 09/06/2020    MCV 96.4 09/06/2020    MCH 33.9 09/06/2020    MCHC 35.2 09/06/2020    RDW 13.0 09/06/2020     09/06/2020    MPV 9.5 09/06/2020     CBC with Differential:    Lab Results   Component Value Date    WBC 7.8 09/06/2020    RBC 3.57 09/06/2020    HGB 12.1 09/06/2020    HCT 34.4 09/06/2020     09/06/2020    MCV 96.4 09/06/2020    MCH 33.9 09/06/2020    MCHC 35.2 09/06/2020    RDW 13.0 09/06/2020    NRBC 0.0 01/25/2018    METASPCT 0.9 01/25/2018    LYMPHOPCT 28.7 09/04/2020    MONOPCT 8.5 09/04/2020    MYELOPCT 0.9 01/25/2018    BASOPCT 0.7 09/04/2020    MONOSABS 0.61 09/04/2020    LYMPHSABS 2.06 09/04/2020    EOSABS 0.31 09/04/2020    BASOSABS 0.05 09/04/2020     Hemoglobin/Hematocrit:    Lab Results   Component Value Date    HGB 12.1 09/06/2020    HCT 34.4 09/06/2020     CMP:    Lab Results   Component Value Date     09/06/2020    K 3.6 09/06/2020    K 3.9 09/04/2020     09/06/2020    CO2 19 09/06/2020    BUN 30 09/06/2020    CREATININE 2.7 09/06/2020    GFRAA 28 09/06/2020    LABGLOM 23 09/06/2020    GLUCOSE 95 09/06/2020    GLUCOSE 136 06/17/2011    PROT 6.0 09/06/2020    LABALBU 3.4 09/06/2020 CALCIUM 8.9 09/06/2020    BILITOT 0.8 09/06/2020    ALKPHOS 102 09/06/2020    AST 25 09/06/2020    ALT 24 09/06/2020     BMP:    Lab Results   Component Value Date     09/06/2020    K 3.6 09/06/2020    K 3.9 09/04/2020     09/06/2020    CO2 19 09/06/2020    BUN 30 09/06/2020    LABALBU 3.4 09/06/2020    CREATININE 2.7 09/06/2020    CALCIUM 8.9 09/06/2020    GFRAA 28 09/06/2020    LABGLOM 23 09/06/2020    GLUCOSE 95 09/06/2020    GLUCOSE 136 06/17/2011     BUN/Creatinine:    Lab Results   Component Value Date    BUN 30 09/06/2020    CREATININE 2.7 09/06/2020     Hepatic Function Panel:    Lab Results   Component Value Date    ALKPHOS 102 09/06/2020    ALT 24 09/06/2020    AST 25 09/06/2020    PROT 6.0 09/06/2020    BILITOT 0.8 09/06/2020    BILIDIR 0.3 01/23/2018    IBILI 1.0 01/23/2018    LABALBU 3.4 09/06/2020     Albumin:    Lab Results   Component Value Date    LABALBU 3.4 09/06/2020     Calcium:    Lab Results   Component Value Date    CALCIUM 8.9 09/06/2020     Ionized Calcium:  No results found for: IONCA  Magnesium:    Lab Results   Component Value Date    MG 1.9 09/06/2020     Phosphorus:    Lab Results   Component Value Date    PHOS 3.3 09/06/2020     LDH:    Lab Results   Component Value Date     09/04/2020     Uric Acid:  No results found for: LABURIC, URICACID  PT/INR:    Lab Results   Component Value Date    PROTIME 15.4 01/23/2018    PROTIME 12.1 06/17/2011    INR 1.4 01/23/2018     PTT:    Lab Results   Component Value Date    APTT 27.2 06/17/2011   [APTT}  Troponin:    Lab Results   Component Value Date    TROPONINI <0.01 06/17/2011     U/A:    Lab Results   Component Value Date    COLORU Yellow 09/04/2020    PROTEINU TRACE 09/04/2020    PHUR 5.5 09/04/2020    LABCAST FEW 01/23/2018    WBCUA 5-10 09/04/2020    WBCUA 0-1 06/17/2011    RBCUA 0-1 09/04/2020    RBCUA 5-10 06/17/2011    MUCUS Present 01/23/2018    BACTERIA RARE 09/04/2020    CLARITYU Clear 09/04/2020    SPECGRAV 1.020 2020    LEUKOCYTESUR Negative 2020    UROBILINOGEN 0.2 2020    BILIRUBINUR Negative 2020    BILIRUBINUR NEGATIVE 2011    BLOODU SMALL 2020    GLUCOSEU Negative 2020    GLUCOSEU NEGATIVE 2011    AMORPHOUS MODERATE 2020     ABG:  No results found for: PH, PCO2, PO2, HCO3, BE, THGB, TCO2, O2SAT  HgBA1c:  No results found for: LABA1C  Microalbumen/Creatinine ratio:  No components found for: RUCREAT  FLP:    Lab Results   Component Value Date    TRIG 83 2020    HDL 36 2020    LDLCALC 38 2020    LABVLDL 17 2020     TSH:    Lab Results   Component Value Date    TSH 0.365 2018     VITAMIN B12: No components found for: B12  FOLATE:    Lab Results   Component Value Date    FOLATE >20.0 2020     Iron Saturation:  No components found for: PERCENTFE  FERRITIN:    Lab Results   Component Value Date    FERRITIN 598 2018     PAOLO:  No results found for: ANATITER, PAOLO  AMYLASE:  No results found for: AMYLASE  LIPASE:  No results found for: LIPASE  24 Hour Urine for Protein:  No components found for: RAWUPRO, UHRS3, NWAH80EU, UTV3  24 Hour Urine for Creatinine Clearance:  No components found for: Catherene Plate, UTV10     Imaging:  Patient MRN:  [id number]    : 1950    Age: 69 years    Gender: Male    Order Date:  2020 12:30 PM    EXAM: US RETROPERITONEAL COMPLETE    INDICATION:  THEODORA    THEODORA    COMPARISON: None         FINDINGS:         The kidneys are normal in size, contour and echogenicity. The right    kidney measures 9.4 x 4.7 x 5.5 cm. The left kidney measures 9.7 x 3.9    x 5.2 cm.         Bilateral nonobstructive renal calculi are seen. 10 mm calculus is    seen within an inferior pole calyx of the right kidney. An 18 mm    calculus is also seen within an inferior pole calyx of the left    kidney. No hydronephrosis is seen.  Small cysts are seen along the    upper and lower poles of the left kidney.         The urinary bladder is decompressed  but otherwise grossly    unremarkable.              Impression          Bilateral nephrolithiasis. No hydronephrosis. Assessment  1-Stage II THEODORA with a FeNa 1.09% leading away from decreased effective renal perfusion as the etiology-acute issue may reflect the uncontrolled HTN  Renal US no hydro  UA small blood, protein trace, (+) casts, WBC 5-10 rare angie, LE (-), Ni (-)  Urine protein:Cr 0.43 which does not suggest Nephrotic Syndrome  Cr improving with IVF  Cr 3.0->2.7->2.7  UO 2950mL in last 24 hours  PLAN:1. Await results of basic serologies  2. Continue off ACEI    2- HTN with CKD I-IV with HTN Urgency  BP near goal <130/80  Metoprolol resumed, PO hydralazine increased 9/5  PLAN:1. BP improved, continue current medications    3. CKD G3 with a  cr from 1.2-->1.4mg/dl presumed sec to microvascular disease and a Hx of Nephrolithiasis and HTN  PLAN:1. Stone sent for sample    4. Sec HPTH of Renal Origin  Ca++, iCa++ and PO4 WNL  PTH 83, Vit D 46  PLAN:1. Follow    5. Thrombocytopenia  With the Hx of the Cancer and uncontrolled HTN and the THEODORA   Haptoglobin 122,  LDH elevated but haptoglobin not low to suggest hemolysis  PLAN:1. Follow PLT    Thank you Dr. Anam Sal MD for allowing us to participate in care of [address] [place] [address], [facility] - NP  10:34 AM  9/6/2020   Pt seen and examined all labs reviewed  A/P:  1. Stage II THEODORA-Hep B&C non reactive-C3&4 not low-remainder of the serologies are pending-cr seems to have plateaued-will stop the IVF and follow    2. HTN with CKD I-IV-BP improved this PM post the initiation of the nifedipine    3.  Thrombocytopenia-PLT trending down 129-->115-->107    If the cr improves or stays stable and the BP remains controlled could D/C home in the AM    COLBY Rao MD

## 2020-09-06 NOTE — PROGRESS NOTES
[facility] ER Progress Note    Admitting Date and Time: 9/4/2020 11:24 AM  Admit Dx: Acute kidney injury superimposed on chronic kidney disease ([facility] [address] [N17.9, N18.9]  Acute kidney [facility] [N17.9, N18.9]    Subjective:  Patient is being followed for Acute kidney injury superimposed on chronic kidney disease ([facility] [N17.9, N18.9]  Acute kidney injury superimposed on chronic kidney disease ([facility] [N17.9, N18.9]   Pt feels fine, has no complaints    ROS: denies fever, chills, cp, sob, n/v, HA unless stated above.       hydrALAZINE  37.5 mg Oral 3 times per day    metoprolol succinate  25 mg Oral Nightly    heparin (porcine)  5,000 Units Subcutaneous 3 times per day    sodium chloride flush  10 mL Intravenous 2 times per day    aspirin EC  81 mg Oral Daily    glipiZIDE  2.5 mg Oral QAM AC    therapeutic multivitamin-minerals  1 tablet Oral Daily    vitamin B-12  1,000 mcg Oral Daily    rosuvastatin  40 mg Oral Daily    Vitamin D  1,000 Units Oral Daily    sodium bicarbonate  650 mg Oral BID    pantoprazole  40 mg Oral QAM AC     hydrALAZINE, 5 mg, Q4H PRN  labetalol, 10 mg, Q4H PRN  acetaminophen, 650 mg, Q4H PRN  glucose, 15 g, PRN  dextrose, 12.5 g, PRN  glucagon (rDNA), 1 mg, PRN  dextrose, 100 mL/hr, PRN  sodium chloride flush, 10 mL, PRN  polyethylene glycol, 17 g, Daily PRN         Objective:    /60   Pulse 65   Temp 98.4 °F (36.9 °C) (Oral)   Resp 16   Ht 5' 11\" (1.803 m)   Wt 197 lb 8 oz (89.6 kg)   SpO2 99%   BMI 27.55 kg/m²     General Appearance: alert and oriented to person, place and time and in no acute distress  Skin: warm and dry  Head: normocephalic and atraumatic  Eyes: pupils equal, round, and reactive to light, extraocular eye movements intact, conjunctivae normal  Neck: neck supple and non tender without mass   Pulmonary/Chest: clear to auscultation bilaterally- no wheezes, rales or rhonchi, normal air movement, no respiratory distress  Cardiovascular: normal rate, normal S1 and S2 and no carotid bruits  Abdomen: soft, non-tender, non-distended, normal bowel sounds, no masses or organomegaly  Extremities: no cyanosis, no clubbing and no edema  Neurologic: no cranial nerve deficit and speech normal        Recent Labs     09/04/20  1147 09/05/20  0613 09/06/20  0345    139 139   K 3.9 3.7 3.6   * 108* 109*   CO2 24 23 19*   BUN 39* 34* 30*   CREATININE 3.0* 2.7* 2.7*   GLUCOSE 104* 95 95   CALCIUM 9.2 8.9 8.9       Recent Labs     09/04/20  1147 09/05/20  0613 09/06/20  0345   WBC 7.2 6.1 7.8   RBC 4.13 3.74* 3.57*   HGB 14.0 12.6 12.1*   HCT 40.1 36.6* 34.4*   MCV 97.1 97.9 96.4   MCH 33.9 33.7 33.9   MCHC 34.9* 34.4 35.2*   RDW 13.2 13.2 13.0   * 115* 107*   MPV 9.5 9.8 9.5           Assessment:    Principal Problem:    Acute kidney injury superimposed on chronic kidney disease (HCC)  Active Problems:    Hypertension    Coronary atherosclerosis of native coronary artery    Esophageal cancer (HCC)    Type 2 diabetes mellitus (HCC)    HLD (hyperlipidemia)    Chronic renal insufficiency    Hypertensive urgency  Resolved Problems:    * No resolved hospital problems. *      Plan:  1. Acute renal failure on CKD stage III, baseline creatinine around 1.2-1.4, present with creatinine of 3, might be due to hypertensive urgency/emergency, appreciate nephrology input, started patient on IV fluids, creatinine trending down to 2.7 for 2 days, we will discuss with nephrology, continue monitoring. 2.  History of hypertension, patient presented with blood pressure 212/90, patient was started on hydralazine and labetalol IV as needed, started on oral hydralazine and continued on clonidine 0.1 mg. Still not controlled, start nifedipine   3. Thrombocytopenia, mild platelet count at 060, continue monitoring. 4.  Diabetes type 2: Patient was continued on glipizide  5.   History of hyperlipidemia, continue on Crestor. NOTE: This report was transcribed using voice recognition software. Every effort was made to ensure accuracy; however, inadvertent computerized transcription errors may be present.   Electronically signed by Salomon Canavan, MD on 9/6/2020 at 7:40 AM

## 2020-09-06 NOTE — PROGRESS NOTES
HR 55, hold lopressor at this time and reassess in 2 hours per house doctor. 24 hour chart check complete.   Conor Conklin

## 2020-09-07 VITALS
SYSTOLIC BLOOD PRESSURE: 139 MMHG | HEART RATE: 62 BPM | TEMPERATURE: 98.4 F | WEIGHT: 197.7 LBS | RESPIRATION RATE: 16 BRPM | HEIGHT: 71 IN | OXYGEN SATURATION: 97 % | BODY MASS INDEX: 27.68 KG/M2 | DIASTOLIC BLOOD PRESSURE: 64 MMHG

## 2020-09-07 PROBLEM — D69.6 THROMBOCYTOPENIA (HCC): Status: ACTIVE | Noted: 2020-09-07

## 2020-09-07 LAB
ALBUMIN SERPL-MCNC: 3.7 G/DL (ref 3.5–5.2)
ALP BLD-CCNC: 100 U/L (ref 40–129)
ALT SERPL-CCNC: 27 U/L (ref 0–40)
ANCA IFA: NORMAL
ANION GAP SERPL CALCULATED.3IONS-SCNC: 8 MMOL/L (ref 7–16)
AST SERPL-CCNC: 31 U/L (ref 0–39)
BETA GLOBULIN: 1 AU/ML (ref 0–19)
BILIRUB SERPL-MCNC: 0.7 MG/DL (ref 0–1.2)
BUN BLDV-MCNC: 29 MG/DL (ref 8–23)
CALCIUM IONIZED: 1.26 MMOL/L (ref 1.15–1.33)
CALCIUM SERPL-MCNC: 8.9 MG/DL (ref 8.6–10.2)
CHLORIDE BLD-SCNC: 106 MMOL/L (ref 98–107)
CO2: 22 MMOL/L (ref 22–29)
CREAT SERPL-MCNC: 2.8 MG/DL (ref 0.7–1.2)
GFR AFRICAN AMERICAN: 27
GFR NON-AFRICAN AMERICAN: 22 ML/MIN/1.73
GLUCOSE BLD-MCNC: 94 MG/DL (ref 74–99)
HCT VFR BLD CALC: 34.9 % (ref 37–54)
HEMOGLOBIN: 12.2 G/DL (ref 12.5–16.5)
MAGNESIUM: 1.9 MG/DL (ref 1.6–2.6)
MCH RBC QN AUTO: 33.7 PG (ref 26–35)
MCHC RBC AUTO-ENTMCNC: 35 % (ref 32–34.5)
MCV RBC AUTO: 96.4 FL (ref 80–99.9)
METER GLUCOSE: 85 MG/DL (ref 74–99)
METER GLUCOSE: 91 MG/DL (ref 74–99)
PDW BLD-RTO: 13.2 FL (ref 11.5–15)
PHOSPHORUS: 3.2 MG/DL (ref 2.5–4.5)
PLATELET # BLD: 117 E9/L (ref 130–450)
PMV BLD AUTO: 9.8 FL (ref 7–12)
POTASSIUM SERPL-SCNC: 3.6 MMOL/L (ref 3.5–5)
RBC # BLD: 3.62 E12/L (ref 3.8–5.8)
SODIUM BLD-SCNC: 136 MMOL/L (ref 132–146)
TOTAL PROTEIN: 6.2 G/DL (ref 6.4–8.3)
WBC # BLD: 7.4 E9/L (ref 4.5–11.5)

## 2020-09-07 PROCEDURE — 6370000000 HC RX 637 (ALT 250 FOR IP): Performed by: INTERNAL MEDICINE

## 2020-09-07 PROCEDURE — 85027 COMPLETE CBC AUTOMATED: CPT

## 2020-09-07 PROCEDURE — 82962 GLUCOSE BLOOD TEST: CPT

## 2020-09-07 PROCEDURE — 84100 ASSAY OF PHOSPHORUS: CPT

## 2020-09-07 PROCEDURE — 82330 ASSAY OF CALCIUM: CPT

## 2020-09-07 PROCEDURE — 6360000002 HC RX W HCPCS: Performed by: INTERNAL MEDICINE

## 2020-09-07 PROCEDURE — 6370000000 HC RX 637 (ALT 250 FOR IP): Performed by: FAMILY MEDICINE

## 2020-09-07 PROCEDURE — 2580000003 HC RX 258: Performed by: FAMILY MEDICINE

## 2020-09-07 PROCEDURE — 80053 COMPREHEN METABOLIC PANEL: CPT

## 2020-09-07 PROCEDURE — 36415 COLL VENOUS BLD VENIPUNCTURE: CPT

## 2020-09-07 PROCEDURE — 83735 ASSAY OF MAGNESIUM: CPT

## 2020-09-07 RX ORDER — METOPROLOL SUCCINATE 25 MG/1
25 TABLET, EXTENDED RELEASE ORAL NIGHTLY
Qty: 30 TABLET | Refills: 3 | Status: SHIPPED | OUTPATIENT
Start: 2020-09-07

## 2020-09-07 RX ORDER — NIFEDIPINE 30 MG/1
30 TABLET, FILM COATED, EXTENDED RELEASE ORAL DAILY
Qty: 30 TABLET | Refills: 3 | Status: SHIPPED | OUTPATIENT
Start: 2020-09-08 | End: 2021-10-07 | Stop reason: CLARIF

## 2020-09-07 RX ORDER — HYDRALAZINE HYDROCHLORIDE 25 MG/1
37.5 TABLET, FILM COATED ORAL EVERY 8 HOURS SCHEDULED
Qty: 90 TABLET | Refills: 3 | Status: SHIPPED | OUTPATIENT
Start: 2020-09-07

## 2020-09-07 RX ADMIN — SODIUM BICARBONATE 650 MG: 650 TABLET ORAL at 07:49

## 2020-09-07 RX ADMIN — SODIUM CHLORIDE, PRESERVATIVE FREE 10 ML: 5 INJECTION INTRAVENOUS at 07:48

## 2020-09-07 RX ADMIN — HYDRALAZINE HYDROCHLORIDE 37.5 MG: 25 TABLET, FILM COATED ORAL at 05:58

## 2020-09-07 RX ADMIN — HEPARIN SODIUM 5000 UNITS: 10000 INJECTION INTRAVENOUS; SUBCUTANEOUS at 05:59

## 2020-09-07 RX ADMIN — PANTOPRAZOLE SODIUM 40 MG: 40 TABLET, DELAYED RELEASE ORAL at 05:58

## 2020-09-07 RX ADMIN — GLIPIZIDE 2.5 MG: 5 TABLET ORAL at 05:58

## 2020-09-07 RX ADMIN — ASPIRIN 81 MG: 81 TABLET, COATED ORAL at 07:49

## 2020-09-07 RX ADMIN — NIFEDIPINE 30 MG: 30 TABLET, EXTENDED RELEASE ORAL at 07:49

## 2020-09-07 RX ADMIN — MULTIPLE VITAMINS W/ MINERALS TAB 1 TABLET: TAB at 07:49

## 2020-09-07 RX ADMIN — ROSUVASTATIN CALCIUM 40 MG: 20 TABLET, FILM COATED ORAL at 07:49

## 2020-09-07 RX ADMIN — CYANOCOBALAMIN TAB 1000 MCG 1000 MCG: 1000 TAB at 07:49

## 2020-09-07 RX ADMIN — VITAMIN D 1000 UNITS: 25 TAB ORAL at 07:49

## 2020-09-07 ASSESSMENT — PAIN SCALES - GENERAL: PAINLEVEL_OUTOF10: 0

## 2020-09-07 NOTE — PROGRESS NOTES
Nephrology Progress Note  Patient's Name: Kristen Gallego  9:47 AM  9/7/2020    Nephrologist: Zeny Hilario    Reason for Consult:  THEODORA on CKD G3  Requesting Physician:  Alia Gastelum MD    Chief Complaint:  Elevated cr    History Obtained From:  patient and past medical records    History of Present Ilness:    Kristen Gallego is a 69 y.o. male with prior history CKD G3 with a  cr from 1.2-->1.4mg/dl. Pt seen by me in 2012 at which time he had a serum cr 1.5mg/dl. 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 Since ending the Chemo Rx, only issue infrequent kidney stone with out obstruction. Hx HTN for 10yrs with Hx CVA, No CAD/MI, no CHF. He has an 8 yr Hx DM2 no retinopathy but Neuropathy. His most recent creatinine in April 2020 2.0mg/dl. Dr. Josephine Barney called 8/31/20 with the cr up to 2.5mg/dl and the lisinopril was stopped. A repeat cr 2 days ago up to 2.9mg/dl and in the ED today the cr up to 3.0mg/dl. Pt states over the recent past few days including today he has passed numerous kidney stones. No associated pain fever or chills. //94 since admission. He denies blurry vision, double vision, flushing, CP or SOB    9/7/20: Resting in bed, denies any complaints.        Past Medical History:   Diagnosis Date    Cancer [facility]) 2017    esophageal    [facility]erebral artery occlusion [facility] infarction [facility]     Diabetes mellitus ([facility]     GERD (gastroesophageal reflux disease)     Hyperlipidemia     Hypertension     TIA (transient ischemic attack)     Vocal cord paralysis, unilateral complete 02/16/2018       Past Surgical History:   Procedure Laterality Date    DIAGNOSTIC CARDIAC CATH LAB PROCEDURE      ESOPHAGECTOMY N/A 02/12/2018    [facility], Dr. Reina Marcial SURGERY      TONSILLECTOMY         Family History   Problem Relation Age of Onset    Cancer Mother     Cancer Father     Other Brother         brain tumor        reports that he has never smoked. He has never used smokeless tobacco. He reports that he does not drink alcohol or use drugs. Allergies:  Lipitor [atorvastatin]    Current Medications:    NIFEdipine (PROCARDIA XL) extended release tablet 30 mg, Daily  hydrALAZINE (APRESOLINE) tablet 37.5 mg, 3 times per day  metoprolol succinate (TOPROL XL) extended release tablet 25 mg, Nightly  hydrALAZINE (APRESOLINE) injection 5 mg, Q4H PRN  labetalol (NORMODYNE;TRANDATE) injection 10 mg, Q4H PRN  heparin (porcine) injection 5,000 Units, 3 times per day  acetaminophen (TYLENOL) tablet 650 mg, Q4H PRN  glucose (GLUTOSE) 40 % oral gel 15 g, PRN  dextrose 50 % IV solution, PRN  glucagon (rDNA) injection 1 mg, PRN  dextrose 5 % solution, PRN  sodium chloride flush 0.9 % injection 10 mL, 2 times per day  sodium chloride flush 0.9 % injection 10 mL, PRN  polyethylene glycol (GLYCOLAX) packet 17 g, Daily PRN  aspirin EC tablet 81 mg, Daily  glipiZIDE (GLUCOTROL) tablet 2.5 mg, QAM AC  therapeutic multivitamin-minerals 1 tablet, Daily  vitamin B-12 (CYANOCOBALAMIN) tablet 1,000 mcg, Daily  rosuvastatin (CRESTOR) tablet 40 mg, Daily  Vitamin D (CHOLECALCIFEROL) tablet 1,000 Units, Daily  sodium bicarbonate tablet 650 mg, BID  pantoprazole (PROTONIX) tablet 40 mg, QAM AC        Review of Systems:   Pertinent items are noted in HPI. Remainder of a complete review of systems is (-) other than stated    Physical exam:   Constitutional:  Elderly male in NAD  Vitals: VITALS:  /64   Pulse 62   Temp 98.4 °F (36.9 °C) (Oral)   Resp 16   Ht 5' 11\" (1.803 m)   Wt 197 lb 11.2 oz (89.7 kg)   SpO2 97%   BMI 27.57 kg/m²   24HR INTAKE/OUTPUT:      Intake/Output Summary (Last 24 hours) at 9/7/2020 0909  Last data filed at 9/7/2020 0045  Gross per 24 hour   Intake 1043 ml Output 2825 ml   Net -1782 ml     URINARY CATHETER OUTPUT (Mishra):     DRAIN/TUBE OUTPUT:     VENT SETTINGS:  Vent Information  SpO2: 97 %  Additional Respiratory  Assessments  Pulse: 62  Resp: 16  SpO2: 97 %    Skin: no rash, turgor wnl  Heent:  eomi, mmm  Neck: no bruits or jvd noted  Cardiovascular:  PMI not lat displaced S1, S2 without S3/S4 or rub  Respiratory: CTA B without w/r/r  Abdomen:  +bs, soft, nt, nd  Ext: (-) bilat  lower extremity edema  Psychiatric: mood and affect appropriate, cr nr 2-12 grossly intact  Musculoskeletal:  Rom, muscular strength intact    Data:   Labs:  CBC:   Lab Results   Component Value Date    WBC 7.4 09/07/2020    RBC 3.62 09/07/2020    HGB 12.2 09/07/2020    HCT 34.9 09/07/2020    MCV 96.4 09/07/2020    MCH 33.7 09/07/2020    MCHC 35.0 09/07/2020    RDW 13.2 09/07/2020     09/07/2020    MPV 9.8 09/07/2020     CBC with Differential:    Lab Results   Component Value Date    WBC 7.4 09/07/2020    RBC 3.62 09/07/2020    HGB 12.2 09/07/2020    HCT 34.9 09/07/2020     09/07/2020    MCV 96.4 09/07/2020    MCH 33.7 09/07/2020    MCHC 35.0 09/07/2020    RDW 13.2 09/07/2020    NRBC 0.0 01/25/2018    METASPCT 0.9 01/25/2018    LYMPHOPCT 28.7 09/04/2020    MONOPCT 8.5 09/04/2020    MYELOPCT 0.9 01/25/2018    BASOPCT 0.7 09/04/2020    MONOSABS 0.61 09/04/2020    LYMPHSABS 2.06 09/04/2020    EOSABS 0.31 09/04/2020    BASOSABS 0.05 09/04/2020     Hemoglobin/Hematocrit:    Lab Results   Component Value Date    HGB 12.2 09/07/2020    HCT 34.9 09/07/2020     CMP:    Lab Results   Component Value Date     09/07/2020    K 3.6 09/07/2020    K 3.9 09/04/2020     09/07/2020    CO2 22 09/07/2020    BUN 29 09/07/2020    CREATININE 2.8 09/07/2020    GFRAA 27 09/07/2020    LABGLOM 22 09/07/2020    GLUCOSE 94 09/07/2020    GLUCOSE 136 06/17/2011    PROT 6.2 09/07/2020    LABALBU 3.7 09/07/2020    CALCIUM 8.9 09/07/2020    BILITOT 0.7 09/07/2020    ALKPHOS 100 09/07/2020    AST 31 09/07/2020    ALT 27 09/07/2020     BMP:    Lab Results   Component Value Date     09/07/2020    K 3.6 09/07/2020    K 3.9 09/04/2020     09/07/2020    CO2 22 09/07/2020    BUN 29 09/07/2020    LABALBU 3.7 09/07/2020    CREATININE 2.8 09/07/2020    CALCIUM 8.9 09/07/2020    GFRAA 27 09/07/2020    LABGLOM 22 09/07/2020    GLUCOSE 94 09/07/2020    GLUCOSE 136 06/17/2011     BUN/Creatinine:    Lab Results   Component Value Date    BUN 29 09/07/2020    CREATININE 2.8 09/07/2020     Hepatic Function Panel:    Lab Results   Component Value Date    ALKPHOS 100 09/07/2020    ALT 27 09/07/2020    AST 31 09/07/2020    PROT 6.2 09/07/2020    BILITOT 0.7 09/07/2020    BILIDIR 0.3 01/23/2018    IBILI 1.0 01/23/2018    LABALBU 3.7 09/07/2020     Albumin:    Lab Results   Component Value Date    LABALBU 3.7 09/07/2020     Calcium:    Lab Results   Component Value Date    CALCIUM 8.9 09/07/2020     Ionized Calcium:  No results found for: IONCA  Magnesium:    Lab Results   Component Value Date    MG 1.9 09/07/2020     Phosphorus:    Lab Results   Component Value Date    PHOS 3.2 09/07/2020     LDH:    Lab Results   Component Value Date     09/04/2020     Uric Acid:  No results found for: LABURIC, URICACID  PT/INR:    Lab Results   Component Value Date    PROTIME 15.4 01/23/2018    PROTIME 12.1 06/17/2011    INR 1.4 01/23/2018     PTT:    Lab Results   Component Value Date    APTT 27.2 06/17/2011   [APTT}  Troponin:    Lab Results   Component Value Date    TROPONINI <0.01 06/17/2011     U/A:    Lab Results   Component Value Date    COLORU Yellow 09/04/2020    PROTEINU TRACE 09/04/2020    PHUR 5.5 09/04/2020    LABCAST FEW 01/23/2018    WBCUA 5-10 09/04/2020    WBCUA 0-1 06/17/2011    RBCUA 0-1 09/04/2020    RBCUA 5-10 06/17/2011    MUCUS Present 01/23/2018    BACTERIA RARE 09/04/2020    CLARITYU Clear 09/04/2020    SPECGRAV 1.020 09/04/2020    LEUKOCYTESUR Negative 09/04/2020    UROBILINOGEN 0.2 09/04/2020 BILIRUBINUR Negative 2020    BILIRUBINUR NEGATIVE 2011    BLOODU SMALL 2020    GLUCOSEU Negative 2020    GLUCOSEU NEGATIVE 2011    AMORPHOUS MODERATE 2020     ABG:  No results found for: PH, PCO2, PO2, HCO3, BE, THGB, TCO2, O2SAT  HgBA1c:  No results found for: LABA1C  Microalbumen/Creatinine ratio:  No components found for: RUCREAT  FLP:    Lab Results   Component Value Date    TRIG 83 2020    HDL 36 2020    LDLCALC 38 2020    LABVLDL 17 2020     TSH:    Lab Results   Component Value Date    TSH 0.365 2018     VITAMIN B12: No components found for: B12  FOLATE:    Lab Results   Component Value Date    FOLATE >20.0 2020     Iron Saturation:  No components found for: PERCENTFE  FERRITIN:    Lab Results   Component Value Date    FERRITIN 598 2018     PAOLO:  No results found for: ANATITER, PAOLO  AMYLASE:  No results found for: AMYLASE  LIPASE:  No results found for: LIPASE  24 Hour Urine for Protein:  No components found for: RAWUPRO, UHRS3, CDOH64YE, UTV3  24 Hour Urine for Creatinine Clearance:  No components found for: [place], UTV10     Imaging:  Patient MRN:  [id number]    : 1950    Age: 69 years    Gender: Male    Order Date:  2020 12:30 PM    EXAM: US RETROPERITONEAL COMPLETE    INDICATION:  THEODORA    THEODORA    COMPARISON: None         FINDINGS:         The kidneys are normal in size, contour and echogenicity. The right    kidney measures 9.4 x 4.7 x 5.5 cm. The left kidney measures 9.7 x 3.9    x 5.2 cm.         Bilateral nonobstructive renal calculi are seen. 10 mm calculus is    seen within an inferior pole calyx of the right kidney. An 18 mm    calculus is also seen within an inferior pole calyx of the left    kidney. No hydronephrosis is seen.  Small cysts are seen along the    upper and lower poles of the left kidney.         The urinary bladder is decompressed  but otherwise grossly    unremarkable.           Impression          Bilateral nephrolithiasis. No hydronephrosis. Assessment  1-Stage II THOEDORA with a FeNa 1.09% leading away from decreased effective renal perfusion as the etiology-acute issue may reflect the uncontrolled HTN  Renal US no hydro  UA small blood, protein trace, (+) casts, WBC 5-10 rare angie, LE (-), Ni (-)  Urine protein:Cr 0.43 which does not suggest Nephrotic Syndrome  Cr 3.0->2.7->2.7->2.8  Cr appears to have plateaued  UO [id number] in last 24 hours  Hep B&C non reactive-C3&4 not low-remainder of the serologies are pending  PLAN:1. Continue off ACEI    2- HTN with CKD I-IV with HTN Urgency  BP at/near goal <130/80  Metoprolol resumed, PO hydralazine increased 9/5  Started on nifedipine 9/6  PLAN:1. BP improved, continue current medications    3. CKD G3 with a  cr from 1.2-->1.4mg/dl presumed sec to microvascular disease and a Hx of Nephrolithiasis and HTN  PLAN:1. Stone sent for sample    4. Sec HPTH of Renal Origin  Ca++, iCa++ and PO4 WNL  PTH 83, Vit D 46  PLAN:1. Follow    5. Thrombocytopenia  With the Hx of the Cancer and uncontrolled HTN and the THEODORA   Haptoglobin 122,  LDH elevated but haptoglobin not low to suggest hemolysis  PLAN:1. Follow PLT    Patient stable for discharge from renal perspective when OK with others. Script for lab work in chart. Will need follow up appt with our office.      CHANDAN Peterson NP  9:47 AM  9/7/2020

## 2020-09-07 NOTE — PROGRESS NOTES
Feels ok- no c/o  vss  Lungs clear  Reg rhythm  Abd- soft and non-tender  Ext- no edema  Stable- lab noted  Per orders- continue present care- home when ok with other DrMya's

## 2020-09-08 LAB
ANTI-NUCLEAR ANTIBODY (ANA): NEGATIVE
HISTONE ANTIBODY IGG: 1.1 UNITS (ref 0–0.9)

## 2020-09-09 LAB
ADDENDUM ELECTROPHORESIS URINE RANDOM: NORMAL
ALBUMIN SERPL-MCNC: 3.3 G/DL (ref 3.5–4.7)
ALPHA-1-GLOBULIN: 0.2 G/DL (ref 0.2–0.4)
ALPHA-2-GLOBULIN: 0.6 G/FL (ref 0.5–1)
BETA GLOBULIN: 0.8 G/DL (ref 0.8–1.3)
ELECTROPHORESIS: ABNORMAL
GAMMA GLOBULIN: 0.8 G/DL (ref 0.7–1.6)
IMMUNOFIXATION URINE: NORMAL
TOTAL PROTEIN: 5.8 G/DL (ref 6.4–8.3)

## 2020-09-13 LAB
CALCULI COMPOSITION: NORMAL
MASS: 4 MG
STONE DESCRIPTION: NORMAL
STONE NUMBER: NORMAL
STONE SIZE: NORMAL MM

## 2020-10-06 ENCOUNTER — OFFICE VISIT (OUTPATIENT)
Dept: CARDIOLOGY CLINIC | Age: 70
End: 2020-10-06
Payer: MEDICARE

## 2020-10-06 VITALS
WEIGHT: 198 LBS | DIASTOLIC BLOOD PRESSURE: 64 MMHG | BODY MASS INDEX: 27.72 KG/M2 | SYSTOLIC BLOOD PRESSURE: 138 MMHG | HEIGHT: 71 IN | HEART RATE: 66 BPM | RESPIRATION RATE: 12 BRPM

## 2020-10-06 PROCEDURE — 4040F PNEUMOC VAC/ADMIN/RCVD: CPT | Performed by: INTERNAL MEDICINE

## 2020-10-06 PROCEDURE — G8484 FLU IMMUNIZE NO ADMIN: HCPCS | Performed by: INTERNAL MEDICINE

## 2020-10-06 PROCEDURE — G8417 CALC BMI ABV UP PARAM F/U: HCPCS | Performed by: INTERNAL MEDICINE

## 2020-10-06 PROCEDURE — 1036F TOBACCO NON-USER: CPT | Performed by: INTERNAL MEDICINE

## 2020-10-06 PROCEDURE — 99213 OFFICE O/P EST LOW 20 MIN: CPT | Performed by: INTERNAL MEDICINE

## 2020-10-06 PROCEDURE — 3017F COLORECTAL CA SCREEN DOC REV: CPT | Performed by: INTERNAL MEDICINE

## 2020-10-06 PROCEDURE — G8427 DOCREV CUR MEDS BY ELIG CLIN: HCPCS | Performed by: INTERNAL MEDICINE

## 2020-10-06 PROCEDURE — 1111F DSCHRG MED/CURRENT MED MERGE: CPT | Performed by: INTERNAL MEDICINE

## 2020-10-06 PROCEDURE — 93000 ELECTROCARDIOGRAM COMPLETE: CPT | Performed by: INTERNAL MEDICINE

## 2020-10-06 PROCEDURE — 1123F ACP DISCUSS/DSCN MKR DOCD: CPT | Performed by: INTERNAL MEDICINE

## 2020-10-06 NOTE — PROGRESS NOTES
Subjective:      Patient ID: Brijesh Perdue is a 69 y.o. male. Chief Complaint   Patient presents with    Coronary Artery Disease    Hypertension    Hyperlipidemia       Patient Active Problem List    Diagnosis Date Noted    Thrombocytopenia ([facility] 09/07/2020    Chronic renal insufficiency 09/04/2020    HLD (hyperlipidemia) 11/19/2019    Esophageal cancer ([facility] 01/24/2018    Type 2 diabetes mellitus ([facility] 01/24/2018    Hypertension 09/30/2013    Coronary atherosclerosis of native coronary artery 09/30/2013     Overview Note:     A. Cardiac cath 6/19/2000:  Ostial 70% CX stenosis / 60-70% mid PDA stenosis /  EF 60%  B. Exercise nuclear 2007: 10 METS, no chest pain, ekg changes, nor ischemia. EF 46%         Current Outpatient Medications   Medication Sig Dispense Refill    hydrALAZINE (APRESOLINE) 25 MG tablet Take 1.5 tablets by mouth every 8 hours 90 tablet 3    metoprolol succinate (TOPROL XL) 25 MG extended release tablet Take 1 tablet by mouth nightly 30 tablet 3    sodium bicarbonate 650 MG tablet Take 650 mg by mouth 2 times daily      Multiple Vitamins-Minerals (THERAPEUTIC MULTIVITAMIN-MINERALS) tablet Take 1 tablet by mouth daily      vitamin D 25 MCG (1000 UT) CAPS Take 1,000 Units by mouth daily      vitamin B-12 (CYANOCOBALAMIN) 1000 MCG tablet Take 1,000 mcg by mouth daily      glipiZIDE (GLUCOTROL) 5 MG tablet TAKE 1/2 TABLET BY MOUTH EVERY DAY IN THE MORNING  2    Lansoprazole (PREVACID PO) Take 30 mg by mouth daily OTC      aspirin EC 81 MG EC tablet Take 81 mg by mouth daily      rosuvastatin (CRESTOR) 10 MG tablet Take 40 mg by mouth daily.    30 tablet 3    capecitabine (XELODA) 500 MG chemo tablet        No current facility-administered medications for this visit.          Allergies   Allergen Reactions    Lipitor [Atorvastatin] Other (See Comments)     Causes achy joints       Vitals:    10/06/20 0950   BP: 138/64   Pulse: 66   Resp: 12   Weight: 198 lb (89.8 kg) Height: 5' 11\" (1.803 m)       Social History     Socioeconomic History    Marital status:      Spouse name: Not on file    Number of children: Not on file    Years of education: Not on file    Highest education level: Not on file   Occupational History    Occupation:    Social Needs    Financial resource strain: Not on file    Food insecurity     Worry: Not on file     Inability: Not on file   [employer] needs     Medical: Not on file     Non-medical: Not on file   Tobacco Use    Smoking status: Never Smoker    Smokeless tobacco: Never Used   Substance and Sexual Activity    Alcohol use: No    Drug use: No    Sexual activity: Not on file   Lifestyle    Physical activity     Days per week: Not on file     Minutes per session: Not on file    Stress: Not on file   Relationships    Social connections     Talks on phone: Not on file     Gets together: Not on file     Attends [employer] service: Not on file     Active member of club or organization: Not on file     Attends meetings of clubs or organizations: Not on file     Relationship status: Not on file    Intimate partner violence     Fear of current or ex partner: Not on file     Emotionally abused: Not on file     Physically abused: Not on file     Forced sexual activity: Not on file   Other Topics Concern    Not on file   Social History Narrative    Not on file       Family History   Problem Relation Age of Onset    Cancer Mother     Cancer Father     Other Brother         brain tumor       SUBJECTIVE: Arcadio Molina presents to the office today for follow up of chronic cardiac diagnoses and hfu. I reviewed notes. Was admitted with hypertensive urgency - placed on hydralazine and nifedipine along with BB and ACE stopped. Creat up to 3.0 at some point. Side effects with nifedipine.   He complains of no cardiac symptoms and denies chest pain, dyspnea, fatigue, irregular heart beat, near-syncope, orthopnea, palpitations, paroxysmal nocturnal dyspnea and syncope. He is back to usual routine of walking and doing chores. Last LDL 38         Review of Systems:  Negative 10 system review other than stated above. Objective:   Physical Exam   Constitutional: He is oriented to person, place, and time. He appears well-developed. He is cooperative. See vitals above. HENT:   Head: Normocephalic and atraumatic. Normal lips, teeth, gums, oral mucosa wet. Eyes: Conjunctivae are normal. Pupils are equal, round  Neck: No JVD present. Cardiovascular: Regular rhythm, S1 normal, S2 normal, intact distal pulses and normal pulses. PMI is not displaced. Exam reveals no gallop. No murmur heard. Carotid bruit is not present   Pulmonary/Chest: Effort normal and breath sounds normal. No respiratory distress. Abdominal: Soft. Normal appearance and bowel sounds are normal. He exhibits no abdominal bruit and no pulsatile midline mass. There is no hepatosplenomegaly. There is no tenderness. Musculoskeletal: Normal range of motion. He exhibits no edema. Gait normal   Neurological: He is alert and oriented to person, place, and time. Gait normal.   Skin: Skin is warm and dry. No bruising, no ecchymosis and no rash noted. Rash is not nodular. He is not diaphoretic. No cyanosis. Psychiatric: He has a normal mood and affect. His behavior is normal. Thought content normal.     EKG: nonspecific ST and T waves changes, sinus bradycardia  , unchanged from previous tracings. ASSESSMENT & PLAN:    Patient Active Problem List   Diagnosis    Hypertension: at target, on 3 drug regimen    Hyperlipidemia LDL goal < 70: on high intensity statin     Coronary atherosclerosis of native coronary artery: cath in 2000 showed significant, but non flow limiting lesions, in ostial CX and Mid PDA. No ischemia on stress in 2007, and no angina or new ekg changes at good functional capacity.     *  CVA, hx of          The patient was

## 2020-11-03 ENCOUNTER — OFFICE VISIT (OUTPATIENT)
Dept: ORTHOPEDIC SURGERY | Age: 70
End: 2020-11-03
Payer: MEDICARE

## 2020-11-03 VITALS — WEIGHT: 198 LBS | TEMPERATURE: 97.2 F | BODY MASS INDEX: 27.72 KG/M2 | HEIGHT: 71 IN

## 2020-11-03 PROCEDURE — 4040F PNEUMOC VAC/ADMIN/RCVD: CPT | Performed by: ORTHOPAEDIC SURGERY

## 2020-11-03 PROCEDURE — G8427 DOCREV CUR MEDS BY ELIG CLIN: HCPCS | Performed by: ORTHOPAEDIC SURGERY

## 2020-11-03 PROCEDURE — G8484 FLU IMMUNIZE NO ADMIN: HCPCS | Performed by: ORTHOPAEDIC SURGERY

## 2020-11-03 PROCEDURE — G8417 CALC BMI ABV UP PARAM F/U: HCPCS | Performed by: ORTHOPAEDIC SURGERY

## 2020-11-03 PROCEDURE — 20605 DRAIN/INJ JOINT/BURSA W/O US: CPT | Performed by: ORTHOPAEDIC SURGERY

## 2020-11-03 PROCEDURE — 1036F TOBACCO NON-USER: CPT | Performed by: ORTHOPAEDIC SURGERY

## 2020-11-03 PROCEDURE — 99202 OFFICE O/P NEW SF 15 MIN: CPT | Performed by: ORTHOPAEDIC SURGERY

## 2020-11-03 PROCEDURE — 1123F ACP DISCUSS/DSCN MKR DOCD: CPT | Performed by: ORTHOPAEDIC SURGERY

## 2020-11-03 PROCEDURE — 3017F COLORECTAL CA SCREEN DOC REV: CPT | Performed by: ORTHOPAEDIC SURGERY

## 2020-11-03 RX ORDER — BUPIVACAINE HYDROCHLORIDE 2.5 MG/ML
2 INJECTION, SOLUTION INFILTRATION; PERINEURAL ONCE
Status: COMPLETED | OUTPATIENT
Start: 2020-11-03 | End: 2020-11-03

## 2020-11-03 RX ORDER — TRIAMCINOLONE ACETONIDE 40 MG/ML
40 INJECTION, SUSPENSION INTRA-ARTICULAR; INTRAMUSCULAR ONCE
Status: COMPLETED | OUTPATIENT
Start: 2020-11-03 | End: 2020-11-03

## 2020-11-03 RX ORDER — ALLOPURINOL 100 MG/1
TABLET ORAL
COMMUNITY
Start: 2020-10-15

## 2020-11-03 RX ADMIN — BUPIVACAINE HYDROCHLORIDE 5 MG: 2.5 INJECTION, SOLUTION INFILTRATION; PERINEURAL at 11:45

## 2020-11-03 RX ADMIN — TRIAMCINOLONE ACETONIDE 40 MG: 40 INJECTION, SUSPENSION INTRA-ARTICULAR; INTRAMUSCULAR at 11:45

## 2020-11-03 NOTE — PROGRESS NOTES
Chief Complaint:   Chief Complaint   Patient presents with    Elbow Problem     Swelling bilateral elbows x 2 weeks. HPI     Llana Schilder is a 69 y.o. male, who presents with bilateral elbow swelling, posterior aspect. No history of trauma, no previous problems with the shoulders no other joint complaints. No pain at rest but pain is constant with local pressure such as resting on arm rest or elevating from a seated position. Denies numbness tingling in the hands, no fever chills sweats or other systemic symptom. Has taken nothing by mouth for this pain or swelling, he is on low-dose aspirin as a routine previously. Allergies; medications; past medical, surgical, family, and social history; and problem list have been reviewed today and updated as indicated in this encounter - see below following Ortho specifics. Musculoskeletal: Healthy-appearing 65-year-old male. Isolated finding seen in both elbows with there is rather significant but discrete extra articular swelling at the olecranon bursa of both elbows, these are fluctuant minimally tender nonerythematous, compartments of the forearm are soft nontender intact motor or sensory functions of the fingers, elbow shows full motion without pain crepitus or effusion. Radiologic Studies: Deferred benign clinical exam.    ASSESSMENT/PLAN:    Jame Mixon was seen today for elbow problem. Diagnoses and all orders for this visit:    Olecranon bursitis of both elbows  -     GA ARTHROCENTESIS ASPIR&/INJ INTERM JT/BURS W/O US    Other orders  -     triamcinolone acetonide (KENALOG-40) injection 40 mg  -     bupivacaine (MARCAINE) 0.25 % injection 5 mg       Treatment options were reviewed, from benign neglect through aspiration to surgical intervention.   Questions were asked answered and understood, at the patient's request we performed aspiration of the bilateral olecranon bursa under sterile technique local anesthesia with lidocaine each obtaining between 10 and 15 mL of blood-tinged but otherwise benign-appearing synovial fluid, analysis not considered indicated. Vanna Gaviria was injected bilaterally with Kenalog and Marcaine, tolerated well no difficulty or bleeding, compression wraps were applied patient was advised on activity modification and observation, he will follow-up here in a couple weeks repeat exam if the condition is persistent. Return in about 2 weeks (around 11/17/2020).        Tee Soto MD    11/3/2020  3:20 PM      Patient Active Problem List   Diagnosis    Hypertension    Coronary atherosclerosis of native coronary artery    [facility]    Type 2 diabetes mellitus ([facility]    HLD (hyperlipidemia)    Chronic renal insufficiency    Thrombocytopenia (HCC)       Past Medical History:   Diagnosis Date    Cancer ([facility] 2017    esophageal    Cerebral artery occlusion with cerebral infarction ([facility]     Diabetes mellitus ([facility]     GERD (gastroesophageal reflux disease)     Hyperlipidemia     Hypertension     TIA (transient ischemic attack)     Vocal cord paralysis, unilateral complete 02/16/2018       Past Surgical History:   Procedure Laterality Date    DIAGNOSTIC CARDIAC CATH LAB PROCEDURE      ESOPHAGECTOMY N/A 02/12/2018    [facility], Dr. Wesley Calhoun TONSILLECTOMY         Current Outpatient Medications   Medication Sig Dispense Refill    allopurinol (ZYLOPRIM) 100 MG tablet TAKE ONE TABLET BY MOUTH ONCE DAILY      hydrALAZINE (APRESOLINE) 25 MG tablet Take 1.5 tablets by mouth every 8 hours 90 tablet 3    metoprolol succinate (TOPROL XL) 25 MG extended release tablet Take 1 tablet by mouth nightly 30 tablet 3    sodium bicarbonate 650 MG tablet Take 650 mg by mouth 2 times daily      Multiple Vitamins-Minerals (THERAPEUTIC MULTIVITAMIN-MINERALS) tablet Take 1 tablet by mouth daily      vitamin D 25 MCG (1000 UT) CAPS Take 1,000 Units by mouth daily      vitamin B-12 (CYANOCOBALAMIN) 1000 MCG tablet Take 1,000 mcg by mouth daily      glipiZIDE (GLUCOTROL) 5 MG tablet TAKE 1/2 TABLET BY MOUTH EVERY DAY IN THE MORNING  2    Lansoprazole (PREVACID PO) Take 30 mg by mouth daily OTC      aspirin EC 81 MG EC tablet Take 81 mg by mouth daily      rosuvastatin (CRESTOR) 40 MG tablet Take 40 mg by mouth daily       NIFEdipine (ADALAT CC) 30 MG extended release tablet Take 1 tablet by mouth daily (Patient not taking: Reported on 10/6/2020) 30 tablet 3    capecitabine (XELODA) 500 MG chemo tablet        No current facility-administered medications for this visit.         Allergies   Allergen Reactions    Lipitor [Atorvastatin] Other (See Comments)     Causes achy joints       Social History     Socioeconomic History    Marital status:      Spouse name: None    Number of children: None    Years of education: None    Highest education level: None   Occupational History    Occupation:    Social Needs    Financial resource strain: None    Food insecurity     Worry: None     Inability: None    Transportation needs     Medical: None     Non-medical: None   Tobacco Use    Smoking status: Never Smoker    Smokeless tobacco: Never Used   Substance and Sexual Activity    Alcohol use: No    Drug use: No    Sexual activity: None   Lifestyle    Physical activity     Days per week: None     Minutes per session: None    Stress: None   Relationships    Social connections     Talks on phone: None     Gets together: None     Attends [employer] service: None     Active member of club or organization: None     Attends meetings of clubs or organizations: None     Relationship status: None    Intimate partner violence     Fear of current or ex partner: None     Emotionally abused: None     Physically abused: None     Forced sexual activity: None   Other Topics Concern    None   Social History Narrative    None       Family History   Problem Relation Age of Onset  Cancer Mother     Cancer Father     Other Brother         brain tumor         Review of Systems  As follows except as previously noted in HPI:  Constitutional: Negative for chills, diaphoresis, fatigue, fever and unexpected weight change. Respiratory: Negative for cough, shortness of breath and wheezing. Cardiovascular: Negative for chest pain and palpitations. Neurological: Negative for dizziness, syncope, cephalgia. GI / : negative  Musculoskeletal: see HPI       Objective:   Physical Exam   Constitutional: Oriented to person, place, and time. and appears well-developed and well-nourished. :   Head: Normocephalic and atraumatic. Eyes: EOM are normal.   Neck: Neck supple. Cardiovascular: Normal rate and regular rhythm. Pulmonary/Chest: Effort normal. No stridor. No respiratory distress, no wheezes. Abdominal:  No abnormal distension. Neurological: Alert and oriented to person, place, and time. Skin: Skin is warm and dry. Psychiatric: Normal mood and affect.  Behavior is normal. Thought content normal.

## 2020-11-17 ENCOUNTER — OFFICE VISIT (OUTPATIENT)
Dept: ORTHOPEDIC SURGERY | Age: 70
End: 2020-11-17
Payer: MEDICARE

## 2020-11-17 VITALS — TEMPERATURE: 97.2 F | BODY MASS INDEX: 27.72 KG/M2 | WEIGHT: 198 LBS | HEIGHT: 71 IN

## 2020-11-17 PROCEDURE — G8427 DOCREV CUR MEDS BY ELIG CLIN: HCPCS | Performed by: ORTHOPAEDIC SURGERY

## 2020-11-17 PROCEDURE — 99211 OFF/OP EST MAY X REQ PHY/QHP: CPT | Performed by: ORTHOPAEDIC SURGERY

## 2020-11-17 PROCEDURE — G8417 CALC BMI ABV UP PARAM F/U: HCPCS | Performed by: ORTHOPAEDIC SURGERY

## 2020-11-17 NOTE — PROGRESS NOTES
Chief Complaint:   Chief Complaint   Patient presents with    Elbow Problem     F/u bilateral elbow swelling. Swelling has gone down in both elbows. Only has pain in right elbow if he leans on it. Kathleen Black presents for follow-up of bilateral olecranon bursitis, doing very well at this point reports minimal to no swelling perhaps a little more on the left than the right. Activities are normal minimal discomfort with pressure on the elbows, no other joint complaints. Allergies; medications; past medical, surgical, family, and social history; and problem list have been reviewed today and updated as indicated in this encounter seen below. Exam: Bilateral elbow exam shows very mild persisting olecranon bursal swelling perhaps a few drops of fluid on the left but none on the right. No erythema or tenderness whatsoever, full motion no effusion. Radiographs: Deferred benign clinical exam.    Cheryle Brave was seen today for elbow problem. Diagnoses and all orders for this visit:    Olecranon bursitis of both elbows       Doing well following aspiration of bilateral olecranon bursa, observation is advised with activity modification as needed, we talked about treatment options should this prove recurring or progressive follow-up as needed. - Counseling More Than 50% of the Appointment Time: 5 minutes    Return if symptoms worsen or fail to improve.      Current Outpatient Medications   Medication Sig Dispense Refill    allopurinol (ZYLOPRIM) 100 MG tablet TAKE ONE TABLET BY MOUTH ONCE DAILY      hydrALAZINE (APRESOLINE) 25 MG tablet Take 1.5 tablets by mouth every 8 hours 90 tablet 3    metoprolol succinate (TOPROL XL) 25 MG extended release tablet Take 1 tablet by mouth nightly 30 tablet 3    NIFEdipine (ADALAT CC) 30 MG extended release tablet Take 1 tablet by mouth daily 30 tablet 3    sodium bicarbonate 650 MG tablet Take 650 mg by mouth 2 times daily      Multiple Vitamins-Minerals

## 2021-02-23 ENCOUNTER — HOSPITAL ENCOUNTER (OUTPATIENT)
Dept: ULTRASOUND IMAGING | Age: 71
Discharge: HOME | End: 2021-02-25
Payer: OTHER GOVERNMENT

## 2021-02-23 DIAGNOSIS — R74.01 ELEVATION OF LEVELS OF LIVER TRANSAMINASE LEVELS: ICD-10-CM

## 2021-02-23 PROCEDURE — 76705 ECHO EXAM OF ABDOMEN: CPT

## 2021-10-07 ENCOUNTER — OFFICE VISIT (OUTPATIENT)
Dept: CARDIOLOGY CLINIC | Age: 71
End: 2021-10-07
Payer: MEDICARE

## 2021-10-07 VITALS
HEART RATE: 58 BPM | WEIGHT: 195 LBS | SYSTOLIC BLOOD PRESSURE: 141 MMHG | BODY MASS INDEX: 27.3 KG/M2 | HEIGHT: 71 IN | DIASTOLIC BLOOD PRESSURE: 65 MMHG | RESPIRATION RATE: 16 BRPM

## 2021-10-07 DIAGNOSIS — I25.10 ATHEROSCLEROSIS OF NATIVE CORONARY ARTERY OF NATIVE HEART WITHOUT ANGINA PECTORIS: Primary | Chronic | ICD-10-CM

## 2021-10-07 PROCEDURE — 1036F TOBACCO NON-USER: CPT | Performed by: INTERNAL MEDICINE

## 2021-10-07 PROCEDURE — G8484 FLU IMMUNIZE NO ADMIN: HCPCS | Performed by: INTERNAL MEDICINE

## 2021-10-07 PROCEDURE — G8417 CALC BMI ABV UP PARAM F/U: HCPCS | Performed by: INTERNAL MEDICINE

## 2021-10-07 PROCEDURE — 99213 OFFICE O/P EST LOW 20 MIN: CPT | Performed by: INTERNAL MEDICINE

## 2021-10-07 PROCEDURE — 3017F COLORECTAL CA SCREEN DOC REV: CPT | Performed by: INTERNAL MEDICINE

## 2021-10-07 PROCEDURE — G8427 DOCREV CUR MEDS BY ELIG CLIN: HCPCS | Performed by: INTERNAL MEDICINE

## 2021-10-07 PROCEDURE — 4040F PNEUMOC VAC/ADMIN/RCVD: CPT | Performed by: INTERNAL MEDICINE

## 2021-10-07 PROCEDURE — 93000 ELECTROCARDIOGRAM COMPLETE: CPT | Performed by: INTERNAL MEDICINE

## 2021-10-07 PROCEDURE — 1123F ACP DISCUSS/DSCN MKR DOCD: CPT | Performed by: INTERNAL MEDICINE

## 2021-10-07 NOTE — PROGRESS NOTES
Subjective:      Patient ID: Myrtle Willoughby is a 66 y.o. male. Chief Complaint   Patient presents with    Coronary Artery Disease       Patient Active Problem List    Diagnosis Date Noted    Thrombocytopenia ([facility] 09/07/2020    Chronic renal insufficiency 09/04/2020    HLD (hyperlipidemia) 11/19/2019    Esophageal cancer ([facility] 01/24/2018    Type 2 diabetes mellitus ([place] [id number] 01/24/2018    Hypertension 09/30/2013    Coronary atherosclerosis of native coronary artery 09/30/2013     Overview Note:     A. Cardiac cath 6/19/2000:  Ostial 70% CX stenosis / 60-70% mid PDA stenosis /  EF 60%  B. Exercise nuclear 2007: 10 METS, no chest pain, ekg changes, nor ischemia. EF 46%         Current Outpatient Medications   Medication Sig Dispense Refill    hydrALAZINE (APRESOLINE) 25 MG tablet Take 1.5 tablets by mouth every 8 hours 90 tablet 3    metoprolol succinate (TOPROL XL) 25 MG extended release tablet Take 1 tablet by mouth nightly 30 tablet 3    sodium bicarbonate 650 MG tablet Take 650 mg by mouth 2 times daily      Multiple Vitamins-Minerals (THERAPEUTIC MULTIVITAMIN-MINERALS) tablet Take 1 tablet by mouth daily      vitamin D 25 MCG (1000 UT) CAPS Take 1,000 Units by mouth daily      vitamin B-12 (CYANOCOBALAMIN) 1000 MCG tablet Take 1,000 mcg by mouth daily      glipiZIDE (GLUCOTROL) 5 MG tablet TAKE 1/2 TABLET BY MOUTH EVERY DAY IN THE MORNING  2    Lansoprazole (PREVACID PO) Take 30 mg by mouth daily OTC      aspirin EC 81 MG EC tablet Take 81 mg by mouth daily      rosuvastatin (CRESTOR) 10 MG tablet Take 40 mg by mouth daily.    30 tablet 3    capecitabine (XELODA) 500 MG chemo tablet        No current facility-administered medications for this visit.          Allergies   Allergen Reactions    Lipitor [Atorvastatin] Other (See Comments)     Causes achy joints       Vitals:    10/07/21 0853   BP: (!) 141/65   Pulse: 58   Resp: 16   Weight: 195 lb (88.5 kg)   Height: 5' 11\" (1.803 m) Social History     Socioeconomic History    Marital status:      Spouse name: Not on file    Number of children: Not on file    Years of education: Not on file    Highest education level: Not on file   Occupational History    Occupation:    Tobacco Use    Smoking status: Never Smoker    Smokeless tobacco: Never Used   Vaping Use    Vaping Use: Never used   Substance and Sexual Activity    Alcohol use: No    Drug use: No    Sexual activity: Not on file   Other Topics Concern    Not on file   Social History Narrative    Not on file     Social Determinants of Health     Financial Resource Strain:     Difficulty of Paying Living Expenses:    Food Insecurity:     Worried About [address] in the Last Year:     [address] [employer] in the Last Year:    Transportation Needs:     Lack of Transportation (Medical):  Lack of Transportation (Non-Medical):    Physical Activity:     Days of Exercise per Week:     Minutes of Exercise per Session:    Stress:     Feeling of Stress :    Social Connections:     Frequency of Communication with Friends and Family:     Frequency of Social Gatherings with Friends and Family:     Attends Restoration[employer] Services:     Active Member of Clubs or Organizations:     Attends Club or Organization Meetings:     Marital Status:    Intimate Partner Violence:     Fear of Current or Ex-Partner:     Emotionally Abused:     Physically Abused:     Sexually Abused:        Family History   Problem Relation Age of Onset    Cancer Mother     Cancer Father     Other Brother         brain tumor       SUBJECTIVE: Jimmie Sauer presents to the office today for follow up of chronic cardiac diagnoses. He complains of no cardiac symptoms and denies chest pain, dyspnea, fatigue, irregular heart beat, near-syncope, orthopnea, palpitations, paroxysmal nocturnal dyspnea and syncope. He is back to usual routine of walking and doing chores.   Just saw dr Reyes Jetty and creat stable, as was BP regimen        Review of Systems:  Negative 10 system review other than stated above. Objective:   Physical Exam   Constitutional: He is oriented to person, place, and time. He appears well-developed. He is cooperative. See vitals above. HENT:   Head: Normocephalic and atraumatic. Normal lips, teeth, gums, oral mucosa wet. Eyes: Conjunctivae are normal. Pupils are equal, round  Neck: No JVD present. Cardiovascular: Regular rhythm, S1 normal, S2 normal, intact distal pulses and normal pulses. PMI is not displaced. Exam reveals no gallop. No murmur heard. Carotid bruit is not present   Pulmonary/Chest: Effort normal and breath sounds normal. No respiratory distress. Abdominal: Soft. Normal appearance and bowel sounds are normal. He exhibits no abdominal bruit and no pulsatile midline mass. There is no hepatosplenomegaly. There is no tenderness. Musculoskeletal: Normal range of motion. He exhibits no edema. Gait normal   Neurological: He is alert and oriented to person, place, and time. Gait normal.   Skin: Skin is warm and dry. No bruising, no ecchymosis and no rash noted. Rash is not nodular. He is not diaphoretic. No cyanosis. Psychiatric: He has a normal mood and affect. His behavior is normal. Thought content normal.     EKG: nonspecific ST and T waves changes, sinus rhythm, rate 66 , unchanged from previous tracings. ASSESSMENT & PLAN:    Patient Active Problem List   Diagnosis    Hypertension: at target, directed by renal     Hyperlipidemia LDL goal < 70: on high intensity statin     Coronary atherosclerosis of native coronary artery: cath in 2000 showed s lesions in ostial CX and Mid PDA. No ischemia on stress in 2007, and no angina or new ekg changes at good functional capacity. *  CVA, hx of          The patient was educated as to the symptoms that would require a prompt return to our office.   Short of that, he will be seen at his regularly scheduled visit in 1 year.

## 2022-10-10 ENCOUNTER — HOSPITAL ENCOUNTER (OUTPATIENT)
Age: 72
Discharge: HOME | End: 2022-10-10
Payer: MEDICARE

## 2022-10-10 ENCOUNTER — OFFICE VISIT (OUTPATIENT)
Dept: CARDIOLOGY CLINIC | Age: 72
End: 2022-10-10
Payer: MEDICARE

## 2022-10-10 VITALS
BODY MASS INDEX: 27.05 KG/M2 | WEIGHT: 193.2 LBS | DIASTOLIC BLOOD PRESSURE: 74 MMHG | HEART RATE: 46 BPM | RESPIRATION RATE: 15 BRPM | HEIGHT: 71 IN | SYSTOLIC BLOOD PRESSURE: 178 MMHG

## 2022-10-10 DIAGNOSIS — R00.1 SINUS BRADYCARDIA: ICD-10-CM

## 2022-10-10 DIAGNOSIS — I25.10 ATHEROSCLEROSIS OF NATIVE CORONARY ARTERY OF NATIVE HEART WITHOUT ANGINA PECTORIS: Primary | ICD-10-CM

## 2022-10-10 LAB
ALBUMIN SERPL-MCNC: 4.1 G/DL (ref 3.5–5.2)
ALP BLD-CCNC: 124 U/L (ref 40–129)
ALT SERPL-CCNC: 57 U/L (ref 0–40)
ANION GAP SERPL CALCULATED.3IONS-SCNC: 10 MMOL/L (ref 7–16)
AST SERPL-CCNC: 51 U/L (ref 0–39)
BILIRUB SERPL-MCNC: 1.2 MG/DL (ref 0–1.2)
BUN BLDV-MCNC: 34 MG/DL (ref 6–23)
CALCIUM SERPL-MCNC: 9.3 MG/DL (ref 8.6–10.2)
CHLORIDE BLD-SCNC: 102 MMOL/L (ref 98–107)
CHOLESTEROL, TOTAL: 106 MG/DL (ref 0–199)
CO2: 24 MMOL/L (ref 22–29)
CREAT SERPL-MCNC: 2.2 MG/DL (ref 0.7–1.2)
GFR AFRICAN AMERICAN: 36
GFR NON-AFRICAN AMERICAN: 29 ML/MIN/1.73
GLUCOSE BLD-MCNC: 85 MG/DL (ref 74–99)
HBA1C MFR BLD: 5.6 % (ref 4–5.6)
HCT VFR BLD CALC: 44.1 % (ref 37–54)
HDLC SERPL-MCNC: 46 MG/DL
HEMOGLOBIN: 14.7 G/DL (ref 12.5–16.5)
LDL CHOLESTEROL CALCULATED: 47 MG/DL (ref 0–99)
MAGNESIUM: 2.2 MG/DL (ref 1.6–2.6)
MCH RBC QN AUTO: 34.8 PG (ref 26–35)
MCHC RBC AUTO-ENTMCNC: 33.3 % (ref 32–34.5)
MCV RBC AUTO: 104.5 FL (ref 80–99.9)
PDW BLD-RTO: 13.2 FL (ref 11.5–15)
PLATELET # BLD: 131 E9/L (ref 130–450)
PMV BLD AUTO: 10.1 FL (ref 7–12)
POTASSIUM SERPL-SCNC: 4.6 MMOL/L (ref 3.5–5)
RBC # BLD: 4.22 E12/L (ref 3.8–5.8)
SODIUM BLD-SCNC: 136 MMOL/L (ref 132–146)
TOTAL PROTEIN: 6.9 G/DL (ref 6.4–8.3)
TRIGL SERPL-MCNC: 65 MG/DL (ref 0–149)
VITAMIN D 25-HYDROXY: 61 NG/ML (ref 30–100)
VLDLC SERPL CALC-MCNC: 13 MG/DL
WBC # BLD: 6.2 E9/L (ref 4.5–11.5)

## 2022-10-10 PROCEDURE — G8484 FLU IMMUNIZE NO ADMIN: HCPCS | Performed by: INTERNAL MEDICINE

## 2022-10-10 PROCEDURE — 99213 OFFICE O/P EST LOW 20 MIN: CPT | Performed by: INTERNAL MEDICINE

## 2022-10-10 PROCEDURE — 1036F TOBACCO NON-USER: CPT | Performed by: INTERNAL MEDICINE

## 2022-10-10 PROCEDURE — G8417 CALC BMI ABV UP PARAM F/U: HCPCS | Performed by: INTERNAL MEDICINE

## 2022-10-10 PROCEDURE — 1123F ACP DISCUSS/DSCN MKR DOCD: CPT | Performed by: INTERNAL MEDICINE

## 2022-10-10 PROCEDURE — 83735 ASSAY OF MAGNESIUM: CPT

## 2022-10-10 PROCEDURE — 80053 COMPREHEN METABOLIC PANEL: CPT

## 2022-10-10 PROCEDURE — 3017F COLORECTAL CA SCREEN DOC REV: CPT | Performed by: INTERNAL MEDICINE

## 2022-10-10 PROCEDURE — 82306 VITAMIN D 25 HYDROXY: CPT

## 2022-10-10 PROCEDURE — 85027 COMPLETE CBC AUTOMATED: CPT

## 2022-10-10 PROCEDURE — G8427 DOCREV CUR MEDS BY ELIG CLIN: HCPCS | Performed by: INTERNAL MEDICINE

## 2022-10-10 PROCEDURE — 93000 ELECTROCARDIOGRAM COMPLETE: CPT | Performed by: INTERNAL MEDICINE

## 2022-10-10 PROCEDURE — 83036 HEMOGLOBIN GLYCOSYLATED A1C: CPT

## 2022-10-10 PROCEDURE — 36415 COLL VENOUS BLD VENIPUNCTURE: CPT

## 2022-10-10 PROCEDURE — 80061 LIPID PANEL: CPT

## 2022-10-10 NOTE — PROGRESS NOTES
Subjective:      Patient ID: Bryan Lange is a 67 y.o. male. Chief Complaint   Patient presents with    Coronary Artery Disease       Patient Active Problem List    Diagnosis Date Noted    Sinus bradycardia 10/10/2022    Thrombocytopenia ([facility] 09/07/2020    Chronic renal insufficiency 09/04/2020    HLD (hyperlipidemia) 11/19/2019    Esophageal cancer ([facility] 01/24/2018    Type 2 diabetes mellitus ([facility] 01/24/2018    Hypertension 09/30/2013    Coronary atherosclerosis of native coronary artery 09/30/2013     Overview Note:     A. Cardiac cath 6/19/2000:  Ostial 70% CX stenosis / 60-70% mid PDA stenosis /  EF 60%  B. Exercise nuclear 2007: 10 METS, no chest pain, ekg changes, nor ischemia. EF 46%         Current Outpatient Medications   Medication Sig Dispense Refill    hydrALAZINE (APRESOLINE) 25 MG tablet Take 1.5 tablets by mouth every 8 hours 90 tablet 3    metoprolol succinate (TOPROL XL) 25 MG extended release tablet Take 1 tablet by mouth nightly 30 tablet 3    sodium bicarbonate 650 MG tablet Take 650 mg by mouth 2 times daily      Multiple Vitamins-Minerals (THERAPEUTIC MULTIVITAMIN-MINERALS) tablet Take 1 tablet by mouth daily      vitamin D 25 MCG (1000 UT) CAPS Take 1,000 Units by mouth daily      vitamin B-12 (CYANOCOBALAMIN) 1000 MCG tablet Take 1,000 mcg by mouth daily      glipiZIDE (GLUCOTROL) 5 MG tablet TAKE 1/2 TABLET BY MOUTH EVERY DAY IN THE MORNING  2    Lansoprazole (PREVACID PO) Take 30 mg by mouth daily OTC      aspirin EC 81 MG EC tablet Take 81 mg by mouth daily      rosuvastatin (CRESTOR) 10 MG tablet Take 40 mg by mouth daily. 30 tablet 3    capecitabine (XELODA) 500 MG chemo tablet        No current facility-administered medications for this visit.          Allergies   Allergen Reactions    Lipitor [Atorvastatin] Other (See Comments)     Causes achy joints       Vitals:    10/10/22 0902 10/10/22 0909   BP: (!) 180/78 (!) 178/74   Pulse: (!) 46    Resp: 15    Weight: 193 lb 3.2 oz (87.6 kg)    Height: 5' 11\" (1.803 m)              SUBJECTIVE: Eduardo Ventura presents to the office today for follow up of chronic cardiac diagnoses. He complains of  no cardiac symptoms  and denies chest pain, dyspnea, fatigue, irregular heart beat, near-syncope, orthopnea, palpitations, paroxysmal nocturnal dyspnea and syncope. Home BPs 130's/80's. Golfs in leagues, does all indoor and outdoor chores. Last LDL in chart < 70            Objective:   Physical Exam   Constitutional: He is oriented to person, place, and time. He appears well-developed. He is cooperative. See vitals above. Neck: No JVD present. Cardiovascular: Regular rhythm, S1 normal, S2 normal, intact distal pulses and normal pulses. PMI is not displaced. Exam reveals no gallop. No murmur heard. Carotid bruit is not present   Pulmonary/Chest: Effort normal and breath sounds normal. No respiratory distress. Abdominal: Soft. Normal appearance and bowel sounds are normal. He exhibits no abdominal bruit and no pulsatile midline mass. Musculoskeletal: Normal range of motion. He exhibits no edema. Neurological: He is alert and oriented to person, place, and time. Gait normal.   Skin: Skin is warm and dry. No bruising, no ecchymosis and no rash noted. Psychiatric: He has a normal mood and affect. His behavior is normal. Thought content normal.     EKG: nonspecific ST and T waves changes, LVH, sinus ela, rate 46 , unchanged from previous tracings. ASSESSMENT & PLAN:    Patient Active Problem List   Diagnosis    Hypertension: at target by home BP readings, directed by renal     Hyperlipidemia LDL goal < 70: on high intensity statin     Coronary atherosclerosis of native coronary artery: cath in 2000 showed s lesions in ostial CX and Mid PDA. No ischemia on stress in 2007, and no angina or new ekg changes at good functional capacity. *  Sinus bradycardia - asymptomatic.   Reviewed issue with patient         The patient was educated as to the symptoms that would require a prompt return to our office. Short of that, he will be seen at his regularly scheduled visit in 1 year.

## 2023-02-16 ENCOUNTER — HOSPITAL ENCOUNTER (OUTPATIENT)
Age: 73
Discharge: HOME | End: 2023-02-16
Payer: MEDICARE

## 2023-02-16 LAB
ALBUMIN SERPL-MCNC: 4.2 G/DL (ref 3.5–5.2)
ALP BLD-CCNC: 111 U/L (ref 40–129)
ALT SERPL-CCNC: 48 U/L (ref 0–40)
AST SERPL-CCNC: 41 U/L (ref 0–39)
BILIRUB SERPL-MCNC: 1.1 MG/DL (ref 0–1.2)
BILIRUBIN DIRECT: 0.3 MG/DL (ref 0–0.3)
BILIRUBIN, INDIRECT: 0.8 MG/DL (ref 0–1)
TOTAL PROTEIN: 6.8 G/DL (ref 6.4–8.3)

## 2023-02-16 PROCEDURE — 36415 COLL VENOUS BLD VENIPUNCTURE: CPT

## 2023-02-16 PROCEDURE — 86803 HEPATITIS C AB TEST: CPT

## 2023-02-16 PROCEDURE — 80076 HEPATIC FUNCTION PANEL: CPT

## 2023-02-17 LAB — HEPATITIS C ANTIBODY INTERPRETATION: NORMAL

## 2023-04-21 ENCOUNTER — HOSPITAL ENCOUNTER (OUTPATIENT)
Age: 73
Discharge: HOME | End: 2023-04-21
Payer: MEDICARE

## 2023-04-21 LAB
ALBUMIN SERPL-MCNC: 4.5 G/DL (ref 3.5–5.2)
ALP SERPL-CCNC: 136 U/L (ref 40–129)
ALT SERPL-CCNC: 81 U/L (ref 0–40)
ANION GAP SERPL CALCULATED.3IONS-SCNC: 10 MMOL/L (ref 7–16)
AST SERPL-CCNC: 66 U/L (ref 0–39)
BILIRUB SERPL-MCNC: 1.1 MG/DL (ref 0–1.2)
BUN SERPL-MCNC: 31 MG/DL (ref 6–23)
CALCIUM SERPL-MCNC: 9.5 MG/DL (ref 8.6–10.2)
CHLORIDE SERPL-SCNC: 103 MMOL/L (ref 98–107)
CHOLESTEROL, TOTAL: 107 MG/DL (ref 0–199)
CO2 SERPL-SCNC: 28 MMOL/L (ref 22–29)
CREAT SERPL-MCNC: 2.1 MG/DL (ref 0.7–1.2)
FERRITIN SERPL-MCNC: 86 NG/ML
GLUCOSE SERPL-MCNC: 111 MG/DL (ref 74–99)
HBA1C MFR BLD: 5.4 % (ref 4–5.6)
HDLC SERPL-MCNC: 51 MG/DL
IGA SERPL-MCNC: 167 MG/DL (ref 70–400)
IGG SERPL-MCNC: 995 MG/DL (ref 700–1600)
IGM SERPL-MCNC: 52 MG/DL (ref 40–230)
INR BLD: 1.1
IRON SATN MFR SERPL: 38 % (ref 20–55)
IRON SERPL-MCNC: 119 MCG/DL (ref 59–158)
LDLC SERPL CALC-MCNC: 40 MG/DL (ref 0–99)
MAGNESIUM SERPL-MCNC: 2.3 MG/DL (ref 1.6–2.6)
POTASSIUM SERPL-SCNC: 4.8 MMOL/L (ref 3.5–5)
PROT SERPL-MCNC: 7.5 G/DL (ref 6.4–8.3)
PROTHROMBIN TIME: 12.7 SEC (ref 9.3–12.4)
PSA SERPL-MCNC: 1.47 NG/ML (ref 0–4)
SODIUM SERPL-SCNC: 141 MMOL/L (ref 132–146)
TIBC SERPL-MCNC: 316 MCG/DL (ref 250–450)
TRIGL SERPL-MCNC: 79 MG/DL (ref 0–149)
VITAMIN D 25-HYDROXY: 77 NG/ML (ref 30–100)
VLDLC SERPL CALC-MCNC: 16 MG/DL

## 2023-04-21 PROCEDURE — G0103 PSA SCREENING: HCPCS

## 2023-04-21 PROCEDURE — 80053 COMPREHEN METABOLIC PANEL: CPT

## 2023-04-21 PROCEDURE — 83735 ASSAY OF MAGNESIUM: CPT

## 2023-04-21 PROCEDURE — 83036 HEMOGLOBIN GLYCOSYLATED A1C: CPT

## 2023-04-21 PROCEDURE — 80061 LIPID PANEL: CPT

## 2023-04-21 PROCEDURE — 82306 VITAMIN D 25 HYDROXY: CPT

## 2023-04-21 PROCEDURE — 36415 COLL VENOUS BLD VENIPUNCTURE: CPT

## 2023-04-22 LAB
Lab: NORMAL
Lab: NORMAL
THIS TEST SENT TO: NORMAL
THIS TEST SENT TO: NORMAL

## 2023-04-24 LAB
Lab: NORMAL
REPORT: NORMAL
THIS TEST SENT TO: NORMAL

## 2023-04-25 LAB
Lab: NORMAL
Lab: NORMAL
REPORT: NORMAL
REPORT: NORMAL
THIS TEST SENT TO: NORMAL
THIS TEST SENT TO: NORMAL

## 2023-04-27 LAB
HAV IGM SERPL QL IA: NORMAL
HBV SURFACE AB SERPL IA-ACNC: NORMAL M[IU]/ML
HBV SURFACE AG SERPL QL IA: NORMAL
HCV AB SERPL QL IA: NORMAL

## 2023-07-24 ENCOUNTER — HOSPITAL ENCOUNTER (OUTPATIENT)
Age: 73
Discharge: HOME | End: 2023-07-24
Payer: MEDICARE

## 2023-07-24 LAB
ERYTHROCYTE [DISTWIDTH] IN BLOOD BY AUTOMATED COUNT: 13.2 % (ref 11.5–15)
FOLATE SERPL-MCNC: >20 NG/ML (ref 4.8–24.2)
HCT VFR BLD AUTO: 44.3 % (ref 37–54)
HGB BLD-MCNC: 14.7 G/DL (ref 12.5–16.5)
MCH RBC QN AUTO: 34.6 PG (ref 26–35)
MCHC RBC AUTO-ENTMCNC: 33.2 G/DL (ref 32–34.5)
MCV RBC AUTO: 104.2 FL (ref 80–99.9)
PLATELET, FLUORESCENCE: 123 K/UL (ref 130–450)
PMV BLD AUTO: 9.9 FL (ref 7–12)
RBC # BLD AUTO: 4.25 M/UL (ref 3.8–5.8)
VIT B12 SERPL-MCNC: >2000 PG/ML (ref 211–946)
WBC OTHER # BLD: 7.2 K/UL (ref 4.5–11.5)

## 2023-07-24 PROCEDURE — 82746 ASSAY OF FOLIC ACID SERUM: CPT

## 2023-07-24 PROCEDURE — 36415 COLL VENOUS BLD VENIPUNCTURE: CPT

## 2023-07-24 PROCEDURE — 85027 COMPLETE CBC AUTOMATED: CPT

## 2023-07-24 PROCEDURE — 82607 VITAMIN B-12: CPT

## 2023-10-09 ENCOUNTER — OFFICE VISIT (OUTPATIENT)
Dept: CARDIOLOGY CLINIC | Age: 73
End: 2023-10-09
Payer: MEDICARE

## 2023-10-09 VITALS
WEIGHT: 189.2 LBS | RESPIRATION RATE: 16 BRPM | HEIGHT: 71 IN | DIASTOLIC BLOOD PRESSURE: 65 MMHG | SYSTOLIC BLOOD PRESSURE: 137 MMHG | BODY MASS INDEX: 26.49 KG/M2 | HEART RATE: 59 BPM

## 2023-10-09 DIAGNOSIS — I25.10 ATHEROSCLEROSIS OF NATIVE CORONARY ARTERY OF NATIVE HEART WITHOUT ANGINA PECTORIS: Primary | ICD-10-CM

## 2023-10-09 PROCEDURE — G8427 DOCREV CUR MEDS BY ELIG CLIN: HCPCS | Performed by: INTERNAL MEDICINE

## 2023-10-09 PROCEDURE — 99214 OFFICE O/P EST MOD 30 MIN: CPT | Performed by: INTERNAL MEDICINE

## 2023-10-09 PROCEDURE — 1123F ACP DISCUSS/DSCN MKR DOCD: CPT | Performed by: INTERNAL MEDICINE

## 2023-10-09 PROCEDURE — 3075F SYST BP GE 130 - 139MM HG: CPT | Performed by: INTERNAL MEDICINE

## 2023-10-09 PROCEDURE — 1036F TOBACCO NON-USER: CPT | Performed by: INTERNAL MEDICINE

## 2023-10-09 PROCEDURE — G8417 CALC BMI ABV UP PARAM F/U: HCPCS | Performed by: INTERNAL MEDICINE

## 2023-10-09 PROCEDURE — 93000 ELECTROCARDIOGRAM COMPLETE: CPT | Performed by: INTERNAL MEDICINE

## 2023-10-09 PROCEDURE — 3017F COLORECTAL CA SCREEN DOC REV: CPT | Performed by: INTERNAL MEDICINE

## 2023-10-09 PROCEDURE — G8484 FLU IMMUNIZE NO ADMIN: HCPCS | Performed by: INTERNAL MEDICINE

## 2023-10-09 PROCEDURE — 3078F DIAST BP <80 MM HG: CPT | Performed by: INTERNAL MEDICINE

## 2024-03-06 ENCOUNTER — OFFICE VISIT (OUTPATIENT)
Dept: SURGERY | Facility: HOSPITAL | Age: 74
End: 2024-03-06
Payer: MEDICARE

## 2024-03-06 ENCOUNTER — HOSPITAL ENCOUNTER (OUTPATIENT)
Dept: RADIOLOGY | Facility: HOSPITAL | Age: 74
Discharge: HOME | End: 2024-03-06
Payer: MEDICARE

## 2024-03-06 VITALS
TEMPERATURE: 97.3 F | RESPIRATION RATE: 16 BRPM | SYSTOLIC BLOOD PRESSURE: 180 MMHG | BODY MASS INDEX: 27.2 KG/M2 | HEART RATE: 53 BPM | WEIGHT: 190 LBS | DIASTOLIC BLOOD PRESSURE: 61 MMHG | HEIGHT: 70 IN | OXYGEN SATURATION: 99 %

## 2024-03-06 DIAGNOSIS — C15.5 MALIGNANT NEOPLASM OF LOWER THIRD OF ESOPHAGUS (MULTI): Primary | ICD-10-CM

## 2024-03-06 DIAGNOSIS — C15.5 MALIGNANT NEOPLASM OF LOWER THIRD OF ESOPHAGUS (MULTI): ICD-10-CM

## 2024-03-06 PROCEDURE — 1126F AMNT PAIN NOTED NONE PRSNT: CPT | Performed by: THORACIC SURGERY (CARDIOTHORACIC VASCULAR SURGERY)

## 2024-03-06 PROCEDURE — 1036F TOBACCO NON-USER: CPT | Performed by: THORACIC SURGERY (CARDIOTHORACIC VASCULAR SURGERY)

## 2024-03-06 PROCEDURE — 1159F MED LIST DOCD IN RCRD: CPT | Performed by: THORACIC SURGERY (CARDIOTHORACIC VASCULAR SURGERY)

## 2024-03-06 PROCEDURE — 99214 OFFICE O/P EST MOD 30 MIN: CPT | Performed by: THORACIC SURGERY (CARDIOTHORACIC VASCULAR SURGERY)

## 2024-03-06 PROCEDURE — 71046 X-RAY EXAM CHEST 2 VIEWS: CPT | Performed by: RADIOLOGY

## 2024-03-06 PROCEDURE — 71046 X-RAY EXAM CHEST 2 VIEWS: CPT

## 2024-03-06 RX ORDER — METOPROLOL SUCCINATE 25 MG/1
25 TABLET, EXTENDED RELEASE ORAL DAILY
COMMUNITY

## 2024-03-06 RX ORDER — CHOLECALCIFEROL (VITAMIN D3) 50 MCG
50 TABLET ORAL DAILY
COMMUNITY

## 2024-03-06 RX ORDER — ASPIRIN 81 MG/1
81 TABLET ORAL DAILY
COMMUNITY

## 2024-03-06 RX ORDER — LANSOPRAZOLE 30 MG/1
15 TABLET, ORALLY DISINTEGRATING, DELAYED RELEASE ORAL
COMMUNITY

## 2024-03-06 RX ORDER — HYDRALAZINE HYDROCHLORIDE 50 MG/1
TABLET, FILM COATED ORAL
COMMUNITY

## 2024-03-06 RX ORDER — GLIPIZIDE 2.5 MG/1
2.5 TABLET, EXTENDED RELEASE ORAL DAILY
COMMUNITY

## 2024-03-06 RX ORDER — ALLOPURINOL 100 MG/1
100 TABLET ORAL DAILY
COMMUNITY

## 2024-03-06 RX ORDER — LANOLIN ALCOHOL/MO/W.PET/CERES
1000 CREAM (GRAM) TOPICAL DAILY
COMMUNITY

## 2024-03-06 ASSESSMENT — COLUMBIA-SUICIDE SEVERITY RATING SCALE - C-SSRS
2. HAVE YOU ACTUALLY HAD ANY THOUGHTS OF KILLING YOURSELF?: NO
6. HAVE YOU EVER DONE ANYTHING, STARTED TO DO ANYTHING, OR PREPARED TO DO ANYTHING TO END YOUR LIFE?: NO
1. IN THE PAST MONTH, HAVE YOU WISHED YOU WERE DEAD OR WISHED YOU COULD GO TO SLEEP AND NOT WAKE UP?: NO

## 2024-03-06 ASSESSMENT — ENCOUNTER SYMPTOMS
LOSS OF SENSATION IN FEET: 0
DEPRESSION: 0
OCCASIONAL FEELINGS OF UNSTEADINESS: 0

## 2024-03-06 ASSESSMENT — PATIENT HEALTH QUESTIONNAIRE - PHQ9
SUM OF ALL RESPONSES TO PHQ9 QUESTIONS 1 AND 2: 0
1. LITTLE INTEREST OR PLEASURE IN DOING THINGS: NOT AT ALL
2. FEELING DOWN, DEPRESSED OR HOPELESS: NOT AT ALL

## 2024-03-06 ASSESSMENT — PAIN SCALES - GENERAL: PAINLEVEL: 0-NO PAIN

## 2024-03-06 NOTE — PROGRESS NOTES
Mr. Maldonado is a 67-year-old male who presented with a GE junction adenocarcinoma. He was staged by EUS as T3N0 and completed neoadjuvant CRT with carbo/taxol. He underwent VATS esophagectomy on 2/12/18. His pathologic stage was [id number]. He received 8 cycles of adjuvant xeloda and oxaliplatin.  He has no dysphagia and no reflux.  He did have some recent elevation of liver enzymes.  A CAT scan of the abdomen was unremarkable and it was determined that this was likely from Crestor.  Since the last visit there have been no changes to the past medical history, past surgical history, social history, family history, or review of systems.    His x-ray today is unremarkable    Mr. Maldonado is doing well and is without evidence of recurrence.  I will see him back in 1 year with an x-ray

## 2024-10-07 ENCOUNTER — TELEPHONE (OUTPATIENT)
Dept: CARDIOLOGY CLINIC | Age: 74
End: 2024-10-07

## 2024-10-07 ENCOUNTER — OFFICE VISIT (OUTPATIENT)
Dept: CARDIOLOGY CLINIC | Age: 74
End: 2024-10-07
Payer: MEDICARE

## 2024-10-07 VITALS
HEIGHT: 71 IN | RESPIRATION RATE: 16 BRPM | BODY MASS INDEX: 24.92 KG/M2 | WEIGHT: 178 LBS | SYSTOLIC BLOOD PRESSURE: 142 MMHG | DIASTOLIC BLOOD PRESSURE: 74 MMHG | HEART RATE: 89 BPM

## 2024-10-07 DIAGNOSIS — I25.10 ATHEROSCLEROSIS OF NATIVE CORONARY ARTERY OF NATIVE HEART WITHOUT ANGINA PECTORIS: Primary | ICD-10-CM

## 2024-10-07 PROCEDURE — 99213 OFFICE O/P EST LOW 20 MIN: CPT | Performed by: INTERNAL MEDICINE

## 2024-10-07 PROCEDURE — G8427 DOCREV CUR MEDS BY ELIG CLIN: HCPCS | Performed by: INTERNAL MEDICINE

## 2024-10-07 PROCEDURE — 3077F SYST BP >= 140 MM HG: CPT | Performed by: INTERNAL MEDICINE

## 2024-10-07 PROCEDURE — G8420 CALC BMI NORM PARAMETERS: HCPCS | Performed by: INTERNAL MEDICINE

## 2024-10-07 PROCEDURE — G8484 FLU IMMUNIZE NO ADMIN: HCPCS | Performed by: INTERNAL MEDICINE

## 2024-10-07 PROCEDURE — 3017F COLORECTAL CA SCREEN DOC REV: CPT | Performed by: INTERNAL MEDICINE

## 2024-10-07 PROCEDURE — 1036F TOBACCO NON-USER: CPT | Performed by: INTERNAL MEDICINE

## 2024-10-07 PROCEDURE — 3078F DIAST BP <80 MM HG: CPT | Performed by: INTERNAL MEDICINE

## 2024-10-07 PROCEDURE — 93000 ELECTROCARDIOGRAM COMPLETE: CPT | Performed by: INTERNAL MEDICINE

## 2024-10-07 PROCEDURE — 1123F ACP DISCUSS/DSCN MKR DOCD: CPT | Performed by: INTERNAL MEDICINE

## 2024-10-07 RX ORDER — HYDRALAZINE HYDROCHLORIDE 25 MG/1
50 TABLET, FILM COATED ORAL 3 TIMES DAILY
COMMUNITY

## 2024-10-07 NOTE — TELEPHONE ENCOUNTER
Patient states he wanted to let you know he takes hydralazine 25mg ,2 tablets TID. Chart reflects medication add.

## 2024-10-07 NOTE — PROGRESS NOTES
Subjective:      Patient ID: Grayson Kim is a 74 y.o. male.        Chief Complaint   Patient presents with    Coronary Artery Disease       Patient Active Problem List    Diagnosis Date Noted    Sinus bradycardia 10/10/2022    Thrombocytopenia (HCC) 09/07/2020    Chronic renal insufficiency 09/04/2020    HLD (hyperlipidemia) 11/19/2019    Esophageal cancer ([facility] 01/24/2018    Type 2 diabetes mellitus ([facility]) 01/24/2018    Hypertension 09/30/2013    Coronary atherosclerosis of native coronary artery 09/30/2013     Overview Note:     A. Cardiac cath 6/19/2000:  Ostial 70% CX stenosis / 60-70% mid PDA stenosis /  EF 60%  B. Exercise nuclear 2007: 10 METS, no chest pain, ekg changes, nor ischemia. EF 46%         Current Outpatient Medications   Medication Sig Dispense Refill    hydrALAZINE (APRESOLINE) 25 MG tablet Take 1.5 tablets by mouth every 8 hours 90 tablet 3    metoprolol succinate (TOPROL XL) 25 MG extended release tablet Take 1 tablet by mouth nightly 30 tablet 3    sodium bicarbonate 650 MG tablet Take 650 mg by mouth 2 times daily      Multiple Vitamins-Minerals (THERAPEUTIC MULTIVITAMIN-MINERALS) tablet Take 1 tablet by mouth daily      vitamin D 25 MCG (1000 UT) CAPS Take 1,000 Units by mouth daily      vitamin B-12 (CYANOCOBALAMIN) 1000 MCG tablet Take 1,000 mcg by mouth daily      glipiZIDE (GLUCOTROL) 5 MG tablet TAKE 1/2 TABLET BY MOUTH EVERY DAY IN THE MORNING  2    Lansoprazole (PREVACID PO) Take 30 mg by mouth daily OTC      aspirin EC 81 MG EC tablet Take 81 mg by mouth daily      rosuvastatin (CRESTOR) 10 MG tablet Take 40 mg by mouth daily.        30 tablet 3    capecitabine (XELODA) 500 MG chemo tablet        No current facility-administered medications for this visit.         Allergies   Allergen Reactions    Lipitor [Atorvastatin] Other (See Comments)     Causes achy joints       Vitals:    10/07/24 0911   BP: (!) 142/74   Pulse: 89   Resp: 16   Weight: 80.7 kg (178 lb)   Height: 1.803 m (5'

## 2025-03-05 ENCOUNTER — APPOINTMENT (OUTPATIENT)
Dept: SURGERY | Facility: HOSPITAL | Age: 75
End: 2025-03-05
Payer: MEDICARE